# Patient Record
Sex: FEMALE | Race: WHITE | NOT HISPANIC OR LATINO
[De-identification: names, ages, dates, MRNs, and addresses within clinical notes are randomized per-mention and may not be internally consistent; named-entity substitution may affect disease eponyms.]

---

## 2017-07-30 ENCOUNTER — TRANSCRIPTION ENCOUNTER (OUTPATIENT)
Age: 27
End: 2017-07-30

## 2018-10-16 ENCOUNTER — TRANSCRIPTION ENCOUNTER (OUTPATIENT)
Age: 28
End: 2018-10-16

## 2018-11-29 ENCOUNTER — TRANSCRIPTION ENCOUNTER (OUTPATIENT)
Age: 28
End: 2018-11-29

## 2018-12-07 ENCOUNTER — TRANSCRIPTION ENCOUNTER (OUTPATIENT)
Age: 28
End: 2018-12-07

## 2019-02-25 ENCOUNTER — TRANSCRIPTION ENCOUNTER (OUTPATIENT)
Age: 29
End: 2019-02-25

## 2019-07-27 ENCOUNTER — TRANSCRIPTION ENCOUNTER (OUTPATIENT)
Age: 29
End: 2019-07-27

## 2020-12-28 ENCOUNTER — ASOB RESULT (OUTPATIENT)
Age: 30
End: 2020-12-28

## 2020-12-28 ENCOUNTER — APPOINTMENT (OUTPATIENT)
Dept: ANTEPARTUM | Facility: CLINIC | Age: 30
End: 2020-12-28
Payer: MEDICAID

## 2020-12-28 PROBLEM — Z00.00 ENCOUNTER FOR PREVENTIVE HEALTH EXAMINATION: Status: ACTIVE | Noted: 2020-12-28

## 2020-12-28 PROCEDURE — 76813 OB US NUCHAL MEAS 1 GEST: CPT | Mod: 59

## 2021-01-06 ENCOUNTER — TRANSCRIPTION ENCOUNTER (OUTPATIENT)
Age: 31
End: 2021-01-06

## 2021-02-22 ENCOUNTER — ASOB RESULT (OUTPATIENT)
Age: 31
End: 2021-02-22

## 2021-02-22 ENCOUNTER — APPOINTMENT (OUTPATIENT)
Dept: ANTEPARTUM | Facility: CLINIC | Age: 31
End: 2021-02-22
Payer: MEDICAID

## 2021-02-22 PROCEDURE — 76805 OB US >/= 14 WKS SNGL FETUS: CPT

## 2021-02-22 PROCEDURE — 99072 ADDL SUPL MATRL&STAF TM PHE: CPT

## 2021-03-26 ENCOUNTER — APPOINTMENT (OUTPATIENT)
Dept: ANTEPARTUM | Facility: CLINIC | Age: 31
End: 2021-03-26
Payer: MEDICAID

## 2021-03-26 ENCOUNTER — ASOB RESULT (OUTPATIENT)
Age: 31
End: 2021-03-26

## 2021-03-26 PROCEDURE — 99072 ADDL SUPL MATRL&STAF TM PHE: CPT

## 2021-03-26 PROCEDURE — 76817 TRANSVAGINAL US OBSTETRIC: CPT

## 2021-03-26 PROCEDURE — 76816 OB US FOLLOW-UP PER FETUS: CPT

## 2021-06-08 ENCOUNTER — APPOINTMENT (OUTPATIENT)
Dept: ANTEPARTUM | Facility: CLINIC | Age: 31
End: 2021-06-08
Payer: MEDICAID

## 2021-06-08 ENCOUNTER — ASOB RESULT (OUTPATIENT)
Age: 31
End: 2021-06-08

## 2021-06-08 PROCEDURE — 76816 OB US FOLLOW-UP PER FETUS: CPT

## 2021-07-08 ENCOUNTER — APPOINTMENT (OUTPATIENT)
Dept: DISASTER EMERGENCY | Facility: OTHER | Age: 31
End: 2021-07-08

## 2024-01-02 ENCOUNTER — HOSPITAL ENCOUNTER (EMERGENCY)
Facility: HOSPITAL | Age: 34
Discharge: LEFT BEFORE DISCHARGE | End: 2024-01-02
Payer: COMMERCIAL

## 2024-01-02 VITALS
OXYGEN SATURATION: 100 % | TEMPERATURE: 98.4 F | SYSTOLIC BLOOD PRESSURE: 132 MMHG | DIASTOLIC BLOOD PRESSURE: 98 MMHG | RESPIRATION RATE: 18 BRPM | HEART RATE: 105 BPM

## 2024-01-02 LAB
ALBUMIN SERPL BCP-MCNC: 4.4 G/DL (ref 3.5–5)
ALP SERPL-CCNC: 65 U/L (ref 34–104)
ALT SERPL W P-5'-P-CCNC: 20 U/L (ref 7–52)
ANION GAP SERPL CALCULATED.3IONS-SCNC: 6 MMOL/L
AST SERPL W P-5'-P-CCNC: 14 U/L (ref 13–39)
BASOPHILS # BLD AUTO: 0.04 THOUSANDS/ÂΜL (ref 0–0.1)
BASOPHILS NFR BLD AUTO: 0 % (ref 0–1)
BILIRUB SERPL-MCNC: 0.54 MG/DL (ref 0.2–1)
BUN SERPL-MCNC: 10 MG/DL (ref 5–25)
CALCIUM SERPL-MCNC: 9.3 MG/DL (ref 8.4–10.2)
CARDIAC TROPONIN I PNL SERPL HS: <2 NG/L
CHLORIDE SERPL-SCNC: 105 MMOL/L (ref 96–108)
CO2 SERPL-SCNC: 26 MMOL/L (ref 21–32)
CREAT SERPL-MCNC: 0.69 MG/DL (ref 0.6–1.3)
EOSINOPHIL # BLD AUTO: 0.16 THOUSAND/ÂΜL (ref 0–0.61)
EOSINOPHIL NFR BLD AUTO: 2 % (ref 0–6)
ERYTHROCYTE [DISTWIDTH] IN BLOOD BY AUTOMATED COUNT: 12.3 % (ref 11.6–15.1)
GFR SERPL CREATININE-BSD FRML MDRD: 116 ML/MIN/1.73SQ M
GLUCOSE SERPL-MCNC: 84 MG/DL (ref 65–140)
HCT VFR BLD AUTO: 47.4 % (ref 34.8–46.1)
HGB BLD-MCNC: 15.5 G/DL (ref 11.5–15.4)
IMM GRANULOCYTES # BLD AUTO: 0.02 THOUSAND/UL (ref 0–0.2)
IMM GRANULOCYTES NFR BLD AUTO: 0 % (ref 0–2)
LYMPHOCYTES # BLD AUTO: 1.89 THOUSANDS/ÂΜL (ref 0.6–4.47)
LYMPHOCYTES NFR BLD AUTO: 19 % (ref 14–44)
MAGNESIUM SERPL-MCNC: 1.8 MG/DL (ref 1.9–2.7)
MCH RBC QN AUTO: 28.6 PG (ref 26.8–34.3)
MCHC RBC AUTO-ENTMCNC: 32.7 G/DL (ref 31.4–37.4)
MCV RBC AUTO: 88 FL (ref 82–98)
MONOCYTES # BLD AUTO: 0.59 THOUSAND/ÂΜL (ref 0.17–1.22)
MONOCYTES NFR BLD AUTO: 6 % (ref 4–12)
NEUTROPHILS # BLD AUTO: 7.21 THOUSANDS/ÂΜL (ref 1.85–7.62)
NEUTS SEG NFR BLD AUTO: 73 % (ref 43–75)
NRBC BLD AUTO-RTO: 0 /100 WBCS
PLATELET # BLD AUTO: 262 THOUSANDS/UL (ref 149–390)
PMV BLD AUTO: 8.9 FL (ref 8.9–12.7)
POTASSIUM SERPL-SCNC: 3.6 MMOL/L (ref 3.5–5.3)
PROT SERPL-MCNC: 7 G/DL (ref 6.4–8.4)
RBC # BLD AUTO: 5.42 MILLION/UL (ref 3.81–5.12)
SODIUM SERPL-SCNC: 137 MMOL/L (ref 135–147)
WBC # BLD AUTO: 9.91 THOUSAND/UL (ref 4.31–10.16)

## 2024-01-02 PROCEDURE — 36415 COLL VENOUS BLD VENIPUNCTURE: CPT

## 2024-01-02 PROCEDURE — 85025 COMPLETE CBC W/AUTO DIFF WBC: CPT

## 2024-01-02 PROCEDURE — 84484 ASSAY OF TROPONIN QUANT: CPT

## 2024-01-02 PROCEDURE — 80053 COMPREHEN METABOLIC PANEL: CPT

## 2024-01-02 PROCEDURE — 83735 ASSAY OF MAGNESIUM: CPT | Performed by: EMERGENCY MEDICINE

## 2024-01-03 ENCOUNTER — OFFICE VISIT (OUTPATIENT)
Dept: URGENT CARE | Facility: CLINIC | Age: 34
End: 2024-01-03
Payer: COMMERCIAL

## 2024-01-03 VITALS
TEMPERATURE: 97.3 F | OXYGEN SATURATION: 99 % | RESPIRATION RATE: 20 BRPM | SYSTOLIC BLOOD PRESSURE: 122 MMHG | DIASTOLIC BLOOD PRESSURE: 74 MMHG | HEART RATE: 99 BPM

## 2024-01-03 DIAGNOSIS — Z75.8 DOES NOT HAVE PRIMARY CARE PROVIDER: ICD-10-CM

## 2024-01-03 DIAGNOSIS — R20.0 NUMBNESS AND TINGLING: Primary | ICD-10-CM

## 2024-01-03 DIAGNOSIS — R20.2 NUMBNESS AND TINGLING: Primary | ICD-10-CM

## 2024-01-03 PROCEDURE — 99213 OFFICE O/P EST LOW 20 MIN: CPT

## 2024-01-03 RX ORDER — BIOTIN 1000 MCG
TABLET,CHEWABLE ORAL 3 TIMES DAILY
COMMUNITY

## 2024-01-03 RX ORDER — MULTIVIT WITH MINERALS/LUTEIN
250 TABLET ORAL DAILY
COMMUNITY

## 2024-01-03 RX ORDER — NABUMETONE 500 MG/1
500 TABLET, FILM COATED ORAL 2 TIMES DAILY
COMMUNITY

## 2024-01-03 NOTE — PROGRESS NOTES
[facility]        NAME: Beatriz Otto is a 33 y.o. female  : 1990    MRN: [id number]  DATE: January 3, 2024  TIME: 10:57 AM    Assessment and Plan   Numbness and tingling [R20.0, R20.2]  1. Numbness and tingling        2. Does not have primary care provider  Ambulatory Referral to Family Practice        Symptoms over 1 year.    Patient Instructions   Establish care with a PCP - a referral has been created.  If symptoms gets worse - proceed to the Emergency Department for re-evaluation.    Chief Complaint     Chief Complaint   Patient presents with   • Numbness     Pt c/o numbeness and tingling in arms and legs mostly on right side and had previous numbness during pregnancy but told it would go away. Now hard to open jars or to walk with loss of strength worse this past week. Also has swollen ankle yerterday on right side         History of Present Illness       States tingling and numbness in extremities over 1 year ago. In the last week, feels like right arm is weaker when trying to hold daughter who is about 26 lbs. States she had carpal tunnel 3 years ago in her right wrist and was told it would be better after pregnancy but it never resolved.        Review of Systems   Review of Systems   Constitutional:  Negative for chills and fever.   Respiratory:  Negative for shortness of breath.    Gastrointestinal:  Negative for abdominal pain, nausea and vomiting.   Neurological:  Positive for weakness and numbness. Negative for dizziness.         Current Medications       Current Outpatient Medications:   •  ascorbic acid (VITAMIN C) 250 mg tablet, Take 250 mg by mouth daily, Disp: , Rfl:   •  Biotin 1000 MCG CHEW, Chew 3 (three) times a day, Disp: , Rfl:   •  nabumetone (RELAFEN) 500 mg tablet, Take 500 mg by mouth 2 (two) times a day, Disp: , Rfl:     Current Allergies     Allergies as of 2024   • (No Known Allergies)            The following portions of the patient's history were reviewed and  updated as appropriate: allergies, current medications, past family history, past medical history, past social history, past surgical history and problem list.     Past Medical History:   Diagnosis Date   • Hypertension    • Irritable bowel    • Psoriatic arthritis (HCC)        History reviewed. No pertinent surgical history.    History reviewed. No pertinent family history.      Medications have been verified.        Objective   /74   Pulse 99   Temp (!) 97.3 °F (36.3 °C) (Tympanic)   Resp 20   SpO2 99%   No LMP recorded.       Physical Exam     Physical Exam  Vitals and nursing note reviewed.   Constitutional:       Appearance: Normal appearance.   HENT:      Head: Normocephalic and atraumatic.   Pulmonary:      Effort: Pulmonary effort is normal.   Skin:     General: Skin is warm and dry.      Capillary Refill: Capillary refill takes less than 2 seconds.   Neurological:      General: No focal deficit present.      Mental Status: She is alert and oriented to person, place, and time. Mental status is at baseline.      GCS: GCS eye subscore is 4. GCS verbal subscore is 5. GCS motor subscore is 6.      Sensory: Sensory deficit (Reports tingling in both hands) present.      Motor: No weakness.      Coordination: Coordination is intact.      Gait: Gait is intact.   Psychiatric:         Mood and Affect: Mood normal.         Behavior: Behavior normal.         Thought Content: Thought content normal.

## 2024-01-03 NOTE — PATIENT INSTRUCTIONS
Establish care with a PCP - a referral has been created.  If symptoms gets worse - proceed to the Emergency Department for re-evaluation.

## 2024-01-05 ENCOUNTER — PATIENT MESSAGE (OUTPATIENT)
Age: 34
End: 2024-01-05

## 2024-01-05 ENCOUNTER — APPOINTMENT (OUTPATIENT)
Age: 34
End: 2024-01-05
Payer: COMMERCIAL

## 2024-01-05 ENCOUNTER — OFFICE VISIT (OUTPATIENT)
Age: 34
End: 2024-01-05
Payer: COMMERCIAL

## 2024-01-05 VITALS
WEIGHT: 138 LBS | SYSTOLIC BLOOD PRESSURE: 118 MMHG | HEART RATE: 110 BPM | TEMPERATURE: 96.5 F | RESPIRATION RATE: 18 BRPM | BODY MASS INDEX: 25.4 KG/M2 | DIASTOLIC BLOOD PRESSURE: 78 MMHG | OXYGEN SATURATION: 92 % | HEIGHT: 62 IN

## 2024-01-05 DIAGNOSIS — F41.9 ANXIETY AND DEPRESSION: ICD-10-CM

## 2024-01-05 DIAGNOSIS — Z00.00 HEALTH CARE MAINTENANCE: ICD-10-CM

## 2024-01-05 DIAGNOSIS — F32.A ANXIETY AND DEPRESSION: ICD-10-CM

## 2024-01-05 DIAGNOSIS — Z12.4 CERVICAL CANCER SCREENING: ICD-10-CM

## 2024-01-05 DIAGNOSIS — Z75.8 DOES NOT HAVE PRIMARY CARE PROVIDER: ICD-10-CM

## 2024-01-05 DIAGNOSIS — L40.50 PSORIATIC ARTHRITIS (HCC): ICD-10-CM

## 2024-01-05 DIAGNOSIS — R20.0 NUMBNESS AND TINGLING: ICD-10-CM

## 2024-01-05 DIAGNOSIS — R20.2 NUMBNESS AND TINGLING: ICD-10-CM

## 2024-01-05 DIAGNOSIS — R20.0 NUMBNESS AND TINGLING: Primary | ICD-10-CM

## 2024-01-05 DIAGNOSIS — R20.2 NUMBNESS AND TINGLING: Primary | ICD-10-CM

## 2024-01-05 LAB
CHOLEST SERPL-MCNC: 193 MG/DL
HDLC SERPL-MCNC: 46 MG/DL
IRON SATN MFR SERPL: 11 % (ref 15–50)
IRON SERPL-MCNC: 46 UG/DL (ref 50–212)
LDLC SERPL CALC-MCNC: 91 MG/DL (ref 0–100)
NONHDLC SERPL-MCNC: 147 MG/DL
TIBC SERPL-MCNC: 416 UG/DL (ref 250–450)
TRIGL SERPL-MCNC: 282 MG/DL
UIBC SERPL-MCNC: 370 UG/DL (ref 155–355)

## 2024-01-05 PROCEDURE — 36415 COLL VENOUS BLD VENIPUNCTURE: CPT

## 2024-01-05 PROCEDURE — 83540 ASSAY OF IRON: CPT

## 2024-01-05 PROCEDURE — 80061 LIPID PANEL: CPT

## 2024-01-05 PROCEDURE — 82607 VITAMIN B-12: CPT

## 2024-01-05 PROCEDURE — 99204 OFFICE O/P NEW MOD 45 MIN: CPT

## 2024-01-05 PROCEDURE — 83550 IRON BINDING TEST: CPT

## 2024-01-05 PROCEDURE — 82746 ASSAY OF FOLIC ACID SERUM: CPT

## 2024-01-05 PROCEDURE — 82728 ASSAY OF FERRITIN: CPT

## 2024-01-05 PROCEDURE — 82652 VIT D 1 25-DIHYDROXY: CPT

## 2024-01-05 PROCEDURE — 84443 ASSAY THYROID STIM HORMONE: CPT

## 2024-01-05 RX ORDER — BUPROPION HYDROCHLORIDE 150 MG/1
300 TABLET ORAL EVERY MORNING
Qty: 180 TABLET | Refills: 3 | Status: SHIPPED | OUTPATIENT
Start: 2024-01-05 | End: 2024-12-30

## 2024-01-05 NOTE — PROGRESS NOTES
Name: Beatriz Otto      : 1990      MRN: [id number]  Encounter Provider: Maria E Carpenter PA-C  Encounter Date: 2024   Encounter department: [facility] [place]    Assessment & Plan     1. Numbness and tingling  Assessment & Plan:  Pt has subjective complaints of numbness and tingling that has been progressing over the past few years, in particular over the last 4-5 months.  No red flags present that would necessitate an urgent referral or imaging.  No history of trauma to spine that would make me consider this radicular in nature.  Pt also stopped her Humira around the time the symptoms were becoming worse, however, the N/T was present prior to losing access to Humira. Pt recently got insurance again and presents for evaluation.    Will start with labs to evaluate for anemia, B12/folate deficiency, thyroid d/o, electrolyte disorder.  Pt had extensive workup with rheumatology in the past and does have psoriatic arthritis so will not repeat these.  Referral to neurology for her migraines and if her labs are unrevealing, may need EMG testing and follow up with neurology anyway.  Will call with results.    Orders:  -     Ambulatory Referral to Neurology; Future  -     Vitamin B12/Folate, Serum Panel; Future  -     Iron Panel (Includes Ferritin, Iron Sat%, Iron, and TIBC); Future  -     TSH, 3rd generation with Free T4 reflex; Future  -     Vitamin D 1,25 dihydroxy; Future    2. Anxiety and depression  Assessment & Plan:  Pt has been on and off several SSRIs throughout the years as a teenager.  She had SI while on Lexapro as a teen.  She did not feel zoloft was a good fit for her.  Not open to prozac.  Did OK on wellbutrin, but doesn't think the dose was high enough.  Pt wishes to go back on the Wellbutrin.  Will start at 150 mg in the morning x 2 weeks, then 150 BID, then 300 a.m. 150 p.m.  Pt will follow up at that time to see how she is doing on these dosages.  She is also a smoker  so this would be a good fit to help her quit at this time.      MARGARITO-7 Flowsheet Screening      Flowsheet Row Most Recent Value   Over the last 2 weeks, how often have you been bothered by any of the following problems?    Feeling nervous, anxious, or on edge 2   Not being able to stop or control worrying 2   Worrying too much about different things 2   Trouble relaxing 2   Being so restless that it is hard to sit still 1   Becoming easily annoyed or irritable 3   Feeling afraid as if something awful might happen 1   MARGARITO-7 Total Score 13          PHQ-2/9 Depression Screening    Little interest or pleasure in doing things: 1 - several days  Feeling down, depressed, or hopeless: 1 - several days  Trouble falling or staying asleep, or sleeping too much: 3 - nearly every day  Feeling tired or having little energy: 3 - nearly every day  Poor appetite or overeatin - more than half the days  Feeling bad about yourself - or that you are a failure or have let yourself or your family down: 3 - nearly every day  Trouble concentrating on things, such as reading the newspaper or watching television: 3 - nearly every day  Moving or speaking so slowly that other people could have noticed. Or the opposite - being so fidgety or restless that you have been moving around a lot more than usual: 0 - not at all  Thoughts that you would be better off dead, or of hurting yourself in some way: 0 - not at all  PHQ-2 Score: 2  PHQ-2 Interpretation: Negative depression screen  PHQ-9 Score: 16  PHQ-9 Interpretation: Moderately severe depression         Orders:  -     buPROPion (WELLBUTRIN XL) 150 mg 24 hr tablet; Take 2 tablets (300 mg total) by mouth every morning Take one tablet once a day for two weeks, then one tablet twice a day for two weeks.  Then increase to two tablets in the morning, one tablet at night for two weeks.    3. Psoriatic arthritis (HCC)  Assessment & Plan:  Rheumatology referral to establish as she is new to this  area.    Orders:  -     Ambulatory Referral to Rheumatology; Future    4. Cervical cancer screening  -     Ambulatory Referral to Gynecology; Future    5. Health care maintenance  -     Lipid panel; Future    6. Does not have primary care provider  Comments:  Esablishing today with me.  Orders:  -     Ambulatory Referral to Family Practice           Subjective      HPI    Pt is here to establish care and for a problem visit.    Pt sees rheumatology for psoriatic arthritis.  Would like referral.    Saw neurology in the past for migraines.  Had them since age 11, comes and goes.  More recently they are more frequent and debilitating.  Denies aura, but does have photo and sound sensitivity.  Last for a least a day or two.  Has only used Advil and ibuprofen in the past.      Pt also had preeclampsia during pregnancy.  BP has been good recently.      Reports during pregnancy told she has pregnancy-induced CTS.  Both hands.  Did not improve post pregnancy.  Did wear wrist braces, but it only helped minimally.  In the past few months it has been progressively getting worse.  It now wakes her up at night with sharp pains accompanied with pins and needles sensation.  She states she started feeling it in her legs with numbness and tingling over the past few months as well.    Also reports extreme fatigue, feels cold all the time.    No past surgical history.  No known vitamin deficiencies.  Was told she has a tricuspid and mitral valve leaks, trace, that is being monitored.  Depression and anxiety.  Denies manic episodes.  Slightly elevated choleserol.  IBS, which the humira helped as well.      She has been getting more frequent periods every 3 weeks and they are heavier since her last pregnancy.  Needs to establish with GYN.      Review of Systems   Constitutional:  Positive for fatigue. Negative for chills, diaphoresis, fever and unexpected weight change.   HENT: Negative.     Eyes:  Negative for visual disturbance.  "  Respiratory:  Negative for shortness of breath.    Cardiovascular:  Negative for chest pain and palpitations.   Gastrointestinal: Negative.    Endocrine: Positive for cold intolerance.   Musculoskeletal:  Positive for arthralgias (generalized upper and lower extremities, intermittent) and myalgias (generalized upper and lower extremities, intermittent). Negative for gait problem.   Skin:  Negative for rash.   Neurological:  Positive for numbness (N/T hands, wrists). Negative for syncope, facial asymmetry, light-headedness and headaches.   All other systems reviewed and are negative.      Current Outpatient Medications on File Prior to Visit   Medication Sig   • ascorbic acid (VITAMIN C) 250 mg tablet Take 250 mg by mouth daily   • Biotin 1000 MCG CHEW Chew 3 (three) times a day   • nabumetone (RELAFEN) 500 mg tablet Take 500 mg by mouth 2 (two) times a day       Objective     /78 (BP Location: Right arm, Patient Position: Sitting, Cuff Size: Standard)   Pulse (!) 110   Temp (!) 96.5 °F (35.8 °C) (Tympanic)   Resp 18   Ht 5' 2\" (1.575 m)   Wt 62.6 kg (138 lb)   SpO2 92%   BMI 25.24 kg/m²     Physical Exam  Vitals reviewed.   Constitutional:       General: She is not in acute distress.     Appearance: Normal appearance. She is not toxic-appearing.   HENT:      Head: Normocephalic and atraumatic.      Right Ear: Hearing, tympanic membrane, ear canal and external ear normal.      Left Ear: Hearing, tympanic membrane, ear canal and external ear normal.      Nose: Nose normal.      Mouth/Throat:      Lips: Pink.      Mouth: Mucous membranes are moist.      Pharynx: Oropharynx is clear. Uvula midline.   Eyes:      General: Lids are normal. No scleral icterus.     Extraocular Movements: Extraocular movements intact.      Conjunctiva/sclera: Conjunctivae normal.      Pupils: Pupils are equal, round, and reactive to light.   Neck:      Thyroid: No thyroid mass, thyromegaly or thyroid tenderness. "   Cardiovascular:      Rate and Rhythm: Normal rate and regular rhythm.      Heart sounds: Normal heart sounds. No murmur heard.  Pulmonary:      Effort: Pulmonary effort is normal. No respiratory distress.      Breath sounds: Normal breath sounds.   Abdominal:      General: Bowel sounds are normal.      Palpations: Abdomen is soft.      Tenderness: There is no abdominal tenderness.   Musculoskeletal:      Cervical back: Full passive range of motion without pain.      Right lower leg: No edema.      Left lower leg: No edema.      Comments: Pt has full active and passive ROM of the upper and lower extremities, steady gait, no point tenderness, no active numbness or tingling during exam.  Pt able to ambulate without difficulty.  No neurologic or MSK deficits noted on exam.   Lymphadenopathy:      Cervical: No cervical adenopathy.   Skin:     General: Skin is warm and dry.      Capillary Refill: Capillary refill takes less than 2 seconds.      Coloration: Skin is not cyanotic.      Findings: No rash.   Neurological:      General: No focal deficit present.      Mental Status: She is alert and oriented to person, place, and time.      GCS: GCS eye subscore is 4. GCS verbal subscore is 5. GCS motor subscore is 6.      Cranial Nerves: Cranial nerves 2-12 are intact.      Sensory: Sensation is intact.      Motor: Motor function is intact.      Coordination: Coordination is intact.      Gait: Gait is intact.   Psychiatric:         Mood and Affect: Mood normal.         Behavior: Behavior normal.       Maria E Carpenter PA-C

## 2024-01-05 NOTE — ASSESSMENT & PLAN NOTE
Pt has subjective complaints of numbness and tingling that has been progressing over the past few years, in particular over the last 4-5 months.  No red flags present that would necessitate an urgent referral or imaging.  No history of trauma to spine that would make me consider this radicular in nature.  Pt also stopped her Humira around the time the symptoms were becoming worse, however, the N/T was present prior to losing access to Humira. Pt recently got insurance again and presents for evaluation.    Will start with labs to evaluate for anemia, B12/folate deficiency, thyroid d/o, electrolyte disorder.  Pt had extensive workup with rheumatology in the past and does have psoriatic arthritis so will not repeat these.  Referral to neurology for her migraines and if her labs are unrevealing, may need EMG testing and follow up with neurology anyway.  Will call with results.

## 2024-01-05 NOTE — ASSESSMENT & PLAN NOTE
Pt has been on and off several SSRIs throughout the years as a teenager.  She had SI while on Lexapro as a teen.  She did not feel zoloft was a good fit for her.  Not open to prozac.  Did OK on wellbutrin, but doesn't think the dose was high enough.  Pt wishes to go back on the Wellbutrin.  Will start at 150 mg in the morning x 2 weeks, then 150 BID, then 300 a.m. 150 p.m.  Pt will follow up at that time to see how she is doing on these dosages.  She is also a smoker so this would be a good fit to help her quit at this time.      MARGARITO-7 Flowsheet Screening      Flowsheet Row Most Recent Value   Over the last 2 weeks, how often have you been bothered by any of the following problems?    Feeling nervous, anxious, or on edge 2   Not being able to stop or control worrying 2   Worrying too much about different things 2   Trouble relaxing 2   Being so restless that it is hard to sit still 1   Becoming easily annoyed or irritable 3   Feeling afraid as if something awful might happen 1   MARGARITO-7 Total Score 13          PHQ-2/9 Depression Screening    Little interest or pleasure in doing things: 1 - several days  Feeling down, depressed, or hopeless: 1 - several days  Trouble falling or staying asleep, or sleeping too much: 3 - nearly every day  Feeling tired or having little energy: 3 - nearly every day  Poor appetite or overeatin - more than half the days  Feeling bad about yourself - or that you are a failure or have let yourself or your family down: 3 - nearly every day  Trouble concentrating on things, such as reading the newspaper or watching television: 3 - nearly every day  Moving or speaking so slowly that other people could have noticed. Or the opposite - being so fidgety or restless that you have been moving around a lot more than usual: 0 - not at all  Thoughts that you would be better off dead, or of hurting yourself in some way: 0 - not at all  PHQ-2 Score: 2  PHQ-2 Interpretation: Negative depression  screen  PHQ-9 Score: 16  PHQ-9 Interpretation: Moderately severe depression

## 2024-01-06 LAB
FERRITIN SERPL-MCNC: 12 NG/ML (ref 11–307)
FOLATE SERPL-MCNC: 16.3 NG/ML
TSH SERPL DL<=0.05 MIU/L-ACNC: 1.84 UIU/ML (ref 0.45–4.5)
VIT B12 SERPL-MCNC: 323 PG/ML (ref 180–914)

## 2024-01-08 LAB — 1,25(OH)2D3 SERPL-MCNC: 32 PG/ML (ref 24.8–81.5)

## 2024-01-09 DIAGNOSIS — R20.0 NUMBNESS AND TINGLING: Primary | ICD-10-CM

## 2024-01-09 DIAGNOSIS — R20.2 NUMBNESS AND TINGLING: Primary | ICD-10-CM

## 2024-01-24 ENCOUNTER — TELEPHONE (OUTPATIENT)
Age: 34
End: 2024-01-24

## 2024-02-01 DIAGNOSIS — F33.1 MODERATE EPISODE OF RECURRENT MAJOR DEPRESSIVE DISORDER (HCC): Primary | ICD-10-CM

## 2024-02-01 DIAGNOSIS — L71.9 ROSACEA: Primary | ICD-10-CM

## 2024-02-01 PROBLEM — F41.1 GENERALIZED ANXIETY DISORDER: Status: ACTIVE | Noted: 2024-02-01

## 2024-02-01 RX ORDER — BUPROPION HYDROCHLORIDE 150 MG/1
150 TABLET ORAL EVERY MORNING
Qty: 90 TABLET | Refills: 0 | Status: SHIPPED | OUTPATIENT
Start: 2024-02-01 | End: 2024-05-01

## 2024-02-20 ENCOUNTER — OFFICE VISIT (OUTPATIENT)
Age: 34
End: 2024-02-20
Payer: COMMERCIAL

## 2024-02-20 VITALS — BODY MASS INDEX: 25.97 KG/M2 | WEIGHT: 142 LBS

## 2024-02-20 DIAGNOSIS — Z30.431 IUD CHECK UP: ICD-10-CM

## 2024-02-20 DIAGNOSIS — N92.0 MENORRHAGIA WITH REGULAR CYCLE: Primary | ICD-10-CM

## 2024-02-20 PROCEDURE — 99203 OFFICE O/P NEW LOW 30 MIN: CPT | Performed by: NURSE PRACTITIONER

## 2024-02-20 RX ORDER — LEVONORGESTREL 52 MG/1
1 INTRAUTERINE DEVICE INTRAUTERINE
COMMUNITY

## 2024-02-20 NOTE — PROGRESS NOTES
Diagnoses and all orders for this visit:    Menorrhagia with regular cycle  -     US pelvis complete w transvaginal; Future    IUD check up  -     Ambulatory Referral to Gynecology    Call if no symptom improvement, all questions answered, return for annual.        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 She states she has been having heavy periods and clotting for about 7-8 months. She is also having bad cramps as well. She states the day before and day of period she gets a high fever. Her last Pap was about year and half ago in [place]. She does not have hx of abnormal paps. Her Mirena placed 2 years ago in June 2022. Her cycles are lasting 5-7 days. She does have a history of ovarian cysts. She does not have any pelvic pain or pain with sex but sometimes feels a twinge of pain which lasts a few seconds during sex. She had a previous Mirena before her last baby and she was amenorrheic on it. Her cycles are different with this Mirena.    Past Medical History:   Diagnosis Date    Asthma 1998    [place] asthma    Coronary artery disease 2015    2 trace leaks in valves    Depression 2004    Heart valve disease 2015    Hypertension     Irritable bowel     Kidney stone 2008,2011,2022    Migraine 2001    Psoriatic arthritis (HCC)      History reviewed. No pertinent surgical history.  Social History     Tobacco Use    Smoking status: Every Day     Current packs/day: 0.50     Average packs/day: 0.5 packs/day for 17.1 years (8.6 ttl pk-yrs)     Types: Cigarettes     Start date: 1/1/2007    Smokeless tobacco: Never   Substance Use Topics    Alcohol use: Never    Drug use: Never     Family History   Problem Relation Age of Onset    Asthma Mother     Heart disease Mother     Hypertension Mother     Lung disease Mother     Migraines Mother     Rashes / Skin problems Mother     Cancer Father     Diabetes Father     Hypertension Father     Thyroid disease Father     Asthma Maternal Grandmother     Heart disease  Maternal Grandmother     Lung disease Maternal Grandmother     Osteoarthritis Maternal Grandmother     Stroke Maternal Grandmother     Asthma Paternal Grandmother     Lung disease Paternal Grandmother        Current Outpatient Medications:     ascorbic acid (VITAMIN C) 250 mg tablet, Take 250 mg by mouth daily, Disp: , Rfl:     Biotin 1000 MCG CHEW, Chew 3 (three) times a day, Disp: , Rfl:     Levonorgestrel (Mirena, 52 MG,) 20 MCG/DAY IUD, 1 each by Intrauterine route Once every 8 years, Disp: , Rfl:     nabumetone (RELAFEN) 500 mg tablet, Take 500 mg by mouth 2 (two) times a day, Disp: , Rfl:     buPROPion (WELLBUTRIN XL) 150 mg 24 hr tablet, Take 1 tablet (150 mg total) by mouth every morning (Patient not taking: Reported on 2024), Disp: 90 tablet, Rfl: 0    No Known Allergies  OB History    Para Term  AB Living   4 3     1 3   SAB IAB Ectopic Multiple Live Births                  # Outcome Date GA Lbr Jeremias/2nd Weight Sex Delivery Anes PTL Lv   4 AB            3 Para            2 Para            1 Para               Obstetric Comments   Vaginal delivery x 3     Girl, boy,girl  3, 9 and 12    Vitals:    24 1127   Weight: 64.4 kg (142 lb)     Body mass index is 25.97 kg/m².  Patient's last menstrual period was 2024.    Review of Systems   Constitutional: Negative for chills, fatigue, fever and unexpected weight change.   Respiratory: Negative for shortness of breath.    Gastrointestinal: Negative for anal bleeding, blood in stool, constipation and diarrhea.   Genitourinary: Negative for difficulty urinating, dysuria and hematuria.     Physical Exam   Constitutional: She appears well-developed and well-nourished. No distress. Alert and oriented.  HENT: atraumatic, EOMI bilaterally  Head: Normocephalic.   Neck: Normal range of motion. Neck supple.   Pulmonary: Effort normal.  Abdominal: Soft.   Pelvic exam was performed with patient supine. No labial fusion. There is no rash, tenderness,  lesion or injury on the right labia. There is no rash, tenderness, lesion or injury on the left labia. Urethral meatus does not show any tenderness, inflammation or discharge. Palpation of midline bladder without pain or discomfort. Uterus is not deviated, not enlarged, not fixed and not tender. Cervix exhibits no motion tenderness, no discharge and no friability. Right adnexum displays no mass, no tenderness and no fullness. Left adnexum displays no mass, no tenderness and no fullness. No erythema or tenderness in the vagina. No foreign body in the vagina. No signs of injury around the vagina. Vaginal discharge found. Perineum and anus without areas of injury. No lesions noted or swelling. IUD strings NOT seen from OS but are palpable.  Lymphadenopathy:        Right: No inguinal adenopathy present.        Left: No inguinal adenopathy present.

## 2024-02-20 NOTE — PATIENT INSTRUCTIONS
Menorrhagia   AMBULATORY CARE:   Menorrhagia  is heavy menstrual bleeding for more than 7 days or severe menstrual bleeding for less than 7 days. Your menstrual bleeding and cramping are so heavy that you have trouble doing your usual daily activities. Your monthly period may also occur more often, and you may bleed between periods. Menorrhagia is common in adolescence and around menopause.       Common symptoms include the following:   Soaking a pad or tampon every 1 to 2 hours    Using both a pad and a tampon    Waking up at night to change your pad or tampon    Blood clots with your bleeding for more than 1 day    Abdominal pain or cramps    Call your local emergency number (911 in the [place]) for any of the following:   You have chest pain and shortness of breath.    Your heart is fluttering or beating faster than usual for you.    Seek care immediately if:   You feel dizzy when you stand.    You feel confused.    You have severe abdominal pain, nausea, and vomiting.    Your skin or the whites of your eyes turn yellow.    Call your doctor or gynecologist if:   You need to change your pad or tampon more than 1 time per hour, for several hours in a row.    You feel more weak and tired than usual.    You have new coldness in your hands and feet.    You have questions or concerns about your condition or care.    Medicines:  You may need any of the following:  Iron supplements  may be given if your blood iron level decreases because of heavy bleeding.    NSAIDs , such as ibuprofen, help decrease swelling, pain, and fever. NSAIDs can cause stomach bleeding or kidney problems in certain people. If you take blood thinner medicine, always ask your healthcare provider if NSAIDs are safe for you. Always read the medicine label and follow directions.    Hormones  help slow or stop your bleeding and make your monthly periods more regular. This medicine may be given as birth control pills or an intrauterine device (IUD).    Take  your medicine as directed.  Contact your healthcare provider if you think your medicine is not helping or if you have side effects. Tell your provider if you are allergic to any medicine. Keep a list of the medicines, vitamins, and herbs you take. Include the amounts, and when and why you take them. Bring the list or the pill bottles to follow-up visits. Carry your medicine list with you in case of an emergency.    Manage your symptoms:   Keep a supply of pads or tampons with you at all times.  If possible, stay close to a bathroom.    Apply heat  on your abdomen to decrease pain and cramps. You can use a heating pad on a low setting. Apply heat for 20 to 30 minutes every 2 hours for as many days as directed.    Follow up with your doctor or gynecologist as directed:  You may need regular pelvic exams with Pap smears to monitor your condition. Write down your questions so you remember to ask them during your visits.  © Copyright [employer] 2023 Information is for End User's use only and may not be sold, redistributed or otherwise used for commercial purposes.  The above information is an  only. It is not intended as medical advice for individual conditions or treatments. Talk to your doctor, nurse or pharmacist before following any medical regimen to see if it is safe and effective for you.

## 2024-02-21 RX ORDER — NABUMETONE 500 MG/1
500 TABLET, FILM COATED ORAL 2 TIMES DAILY
Refills: 0 | Status: CANCELLED | OUTPATIENT
Start: 2024-02-21

## 2024-02-22 DIAGNOSIS — L40.50 PSORIATIC ARTHRITIS (HCC): Primary | ICD-10-CM

## 2024-02-22 RX ORDER — NABUMETONE 500 MG/1
500 TABLET, FILM COATED ORAL 2 TIMES DAILY
Qty: 180 TABLET | Refills: 0 | Status: SHIPPED | OUTPATIENT
Start: 2024-02-22 | End: 2024-05-22

## 2024-02-28 ENCOUNTER — TELEPHONE (OUTPATIENT)
Dept: NEUROLOGY | Facility: CLINIC | Age: 34
End: 2024-02-28

## 2024-02-28 NOTE — TELEPHONE ENCOUNTER
Add on- 02/29 at 1 pm with Dr. Henry    Spoke with patient informing her that her appt needed to be reschedule. Patient understood and was rescheduled for 02/29.

## 2024-02-29 ENCOUNTER — CONSULT (OUTPATIENT)
Dept: NEUROLOGY | Facility: CLINIC | Age: 34
End: 2024-02-29
Payer: COMMERCIAL

## 2024-02-29 VITALS
BODY MASS INDEX: 25.61 KG/M2 | HEART RATE: 114 BPM | DIASTOLIC BLOOD PRESSURE: 80 MMHG | SYSTOLIC BLOOD PRESSURE: 130 MMHG | WEIGHT: 140 LBS

## 2024-02-29 DIAGNOSIS — R51.9 NEW ONSET HEADACHE: ICD-10-CM

## 2024-02-29 DIAGNOSIS — G62.9 NEUROPATHY: ICD-10-CM

## 2024-02-29 DIAGNOSIS — R20.0 NUMBNESS AND TINGLING: Primary | ICD-10-CM

## 2024-02-29 DIAGNOSIS — R20.2 NUMBNESS AND TINGLING: Primary | ICD-10-CM

## 2024-02-29 DIAGNOSIS — M54.10 RADICULOPATHY: ICD-10-CM

## 2024-02-29 DIAGNOSIS — G43.109 MIGRAINE WITH AURA AND WITHOUT STATUS MIGRAINOSUS, NOT INTRACTABLE: ICD-10-CM

## 2024-02-29 DIAGNOSIS — E53.8 B12 DEFICIENCY: ICD-10-CM

## 2024-02-29 PROCEDURE — 96372 THER/PROPH/DIAG INJ SC/IM: CPT | Performed by: PSYCHIATRY & NEUROLOGY

## 2024-02-29 PROCEDURE — 99204 OFFICE O/P NEW MOD 45 MIN: CPT | Performed by: PSYCHIATRY & NEUROLOGY

## 2024-02-29 RX ORDER — MAGNESIUM 200 MG
1000 TABLET ORAL DAILY
Qty: 30 TABLET | Refills: 3 | Status: SHIPPED | OUTPATIENT
Start: 2024-02-29

## 2024-02-29 RX ORDER — DULOXETIN HYDROCHLORIDE 30 MG/1
30 CAPSULE, DELAYED RELEASE ORAL DAILY
Qty: 30 CAPSULE | Refills: 1 | Status: SHIPPED | OUTPATIENT
Start: 2024-02-29

## 2024-02-29 RX ORDER — SUMATRIPTAN 50 MG/1
50 TABLET, FILM COATED ORAL ONCE AS NEEDED
Qty: 9 TABLET | Refills: 4 | Status: SHIPPED | OUTPATIENT
Start: 2024-02-29

## 2024-02-29 RX ORDER — CYANOCOBALAMIN 1000 UG/ML
1000 INJECTION, SOLUTION INTRAMUSCULAR; SUBCUTANEOUS
Status: SHIPPED | OUTPATIENT
Start: 2024-02-29

## 2024-02-29 RX ADMIN — CYANOCOBALAMIN 1000 MCG: 1000 INJECTION, SOLUTION INTRAMUSCULAR; SUBCUTANEOUS at 14:13

## 2024-02-29 NOTE — PROGRESS NOTES
Name: Beatriz Otto   Age & Sex: 33 y.o. female   MRN: [id number]     This patient was discussed and staffed with Dr. Robison    Assessment/Plan:    Pain and paresthesias  Beatriz Otto is a 33 y.o. female who presents for BUE/BLE pain and paresthesias that tends to be worst in the morning and attenuate throughout the day.  Although she clearly has additional CTS (see below), her limb pain sounds more consistent with a generalized rheumatological condition, particularly given that it improves without medication throughout the day and she has been off of her psoriatic arthritis medication due to moving.  That being said, there are aspects of this presentation that could be a length dependent small or large fiber type neuropathy and she is scheduled for an EMG.    Plan:  B12 low, supplement in office and sublingually  Check additional neuropathy labs including B6, zinc, Lyme, and inflammatory labs including ESR/CRP  Imaging as below  Agree with EMG which patient is scheduled for 3/18  F/u 3 mo    CTS  Patient has numbness and pain of bilateral hands which can be exacerbated after sleeping, versus after certain positions like staying in a single driving position for prolonged period.  Patient has mild weakness of thumb opposition and a positive Phalen but is negative Tinel at the wrist and elbow bilaterally and has no signs of thenar, hypothenar or other intrinsic muscle wasting.  She is scheduled to have EMG which will help clarify the degree of her CTS. We discussed the benefit of conservative measures such as overnight bracing to reduce positional exacerbation of her symptoms    Plan:  EMG  Wrist brace    Migraine   Patient's headaches are most consistent with her typical migraines but she does have increased frequency and some red flag features now such as waking up with headaches on a regular basis. At this point because of the evolving characteristics would continue workup including MRI as well as  treating/preventing the headaches with additional medication regimen     Plan:  Abortive: sumatriptan 50 mg   Preventive: trial duloxetine for migraine and possible nerve pain   Cycle breaker: declined for now  Imaging: MRI brain w/wo  Labs: neuropathy labs as above  Headache hygiene discussed including maintaining good sleep habits, drinking sufficient water, getting regular meals and exercise  Migraine/headache triggers outlined including foods, sleep deprivation, dehydration, CALOS  OTC and non-pharmacologic management discussed including Mg and B2, neck exercises and stretching, headache diary apps, etc.     Diagnoses and all orders for this visit:    Numbness and tingling  -     Ambulatory Referral to Neurology  -     MRI brain with and without contrast; Future  -     MRI cervical spine with and without contrast; Future  -     MRI angiogram head without contrast; Future  -     Comprehensive metabolic panel; Future  -     Zinc; Future  -     Vitamin B6; Future  -     Lyme Total AB W Reflex to IGM/IGG; Future  -     ESR-Jay+CRP; Future    New onset headache  -     MRI brain with and without contrast; Future  -     MRI angiogram head without contrast; Future  -     Comprehensive metabolic panel; Future    Radiculopathy  -     MRI cervical spine with and without contrast; Future    Neuropathy  -     Comprehensive metabolic panel; Future  -     Zinc; Future  -     Vitamin B6; Future  -     Lyme Total AB W Reflex to IGM/IGG; Future  -     ESR-Jay+CRP; Future    Migraine with aura and without status migrainosus, not intractable  -     DULoxetine (Cymbalta) 30 mg delayed release capsule; Take 1 capsule (30 mg total) by mouth daily  -     SUMAtriptan (Imitrex) 50 mg tablet; Take 1 tablet (50 mg total) by mouth once as needed for migraine Can take a second dose in 2 hours if not completely effective. Do not take more than 2 doses in a day. Do not take more than twice a week    B12 deficiency  -     cyanocobalamin injection  1,000 mcg  -     Cyanocobalamin (B-12) 1000 MCG SUBL; Place 1 tablet (1,000 mcg total) under the tongue in the morning          Subjective:         Beatriz Otto is a 33 y.o. female w/ PMH psoriatic arthritis and migraines who presents to establish care and to evaluate new onset pain and paresthesias she has been experiencing.  She notes that she awakens with severe pain from her shoulders to her fingertips, as well as sometimes from her hips to her legs and feet.  From a pain perspective she notes that this is predominantly a burning but can be a sharp pain.  She does note that it does not have a normal distribution but sometimes can be provoked by things like driving for a long time.  She does appear to have carpal tunnel with a positive Phalen and positional trigger as described above as well as mild bilateral weakness of thumb opposition.  However the initial pain appears to be more consistent with a rheumatological condition, particularly in the setting of her being unable to fill her psoriatic arthritis medications due to moving.  She is already scheduled to have an EMG to evaluate for possible neuropathy component, but would strongly recommend further evaluation with a rheumatologist with whom she is already scheduled.  He has significant tenderness to palpation right greater than left hip.  She also complains of a migraine which she has experienced since childhood.  This has not been involving this has not been changing in character or consistency, but does have some intermittent worsening of frequency, particularly since moving to the [facility].  She does state that there is a component that may be triggered with sinus pressure.  She is on biotin, bupropion, nabumetone as home meds.  She has had a history of her recent pregnancy with preeclampsia and has had trouble with her blood pressure since then.  She does have some mild tachycardia on exam today.  We did discuss possible preventative treatments  for her migraines including the possibility of TCAs/SNRIs, versus propranolol.  While propranolol would also treat her hypertension and tachycardia, SNRIs might be more appropriate for treating her headache as well as possible nerve pain.  She has had nephrolithiasis twice and therefore cannot have topiramate.    Headache characteristics:  Age of Onset/how long ongoing: since childhood  Location: front, top  Quality: sharp throbbing  Severity: moderate to severe when they occur  Frequency: at least once a week at this time  Duration: can be prolonged (up to 5 days)  Aura: yes black spots or stars  Associated (photo/phono/osmo): photo  Nausea/Vomiting:  nausea  Neurologic Features (numb arm/weak leg):  no  Red Flag Features (worsened with cough or valsalva/positional component/visual deficit): sometimes worsened with straining, headaches on awakening  Triggers: certain smells, one waking  Relieving factors: laying down in cool dark room  OTC Rx (OTC med use/treatment days per month):  limited   Current Prescriptions: wellbutrin  Past Prescriptions: none  OCPs/Pregnancy  no  FH SAH, aneurysm or migraines:  aneurysm in aunt ( @ 27-28), frequent strokes in the family  TBI or neurosurgical intervention: no  Sleep/Mood:  sleep is disrupted (3 kids including 1 y/o who does not sleep through the night)  Caffeine/Water:  significant caffeine, not great with water  Exercise/Regular meals: stays active with kids, does       CTS w/ mild weakness of thumb opposition, phalen positive    Tenderness right hip tenderness more than left        The following portions of the patient's history were reviewed and updated as appropriate: She  has a past medical history of Asthma (), Coronary artery disease (), Depression (), Heart valve disease (), Hypertension, Irritable bowel, Kidney stone (,,), Migraine (), and Psoriatic arthritis (HCC).  She   Patient Active Problem List    Diagnosis Date Noted     Generalized anxiety disorder 02/01/2024    Moderate episode of recurrent major depressive disorder (HCC) 01/05/2024    Psoriatic arthritis (HCC) 01/05/2024    Numbness and tingling 01/05/2024    Migraine 01/19/2001     She  has no past surgical history on file.  Her family history includes Asthma in her maternal grandmother, mother, and paternal grandmother; Cancer in her father; Diabetes in her father; Heart disease in her maternal grandmother and mother; Hypertension in her father and mother; Lung disease in her maternal grandmother, mother, and paternal grandmother; Migraines in her mother; Osteoarthritis in her maternal grandmother; Rashes / Skin problems in her mother; Stroke in her maternal grandmother; Thyroid disease in her father.  She  reports that she has been smoking cigarettes. She started smoking about 17 years ago. She has a 8.6 pack-year smoking history. She has never used smokeless tobacco. She reports that she does not drink alcohol and does not use drugs.  Current Outpatient Medications   Medication Sig Dispense Refill    ascorbic acid (VITAMIN C) 250 mg tablet Take 250 mg by mouth daily      Biotin 1000 MCG CHEW Chew 3 (three) times a day      buPROPion (WELLBUTRIN XL) 150 mg 24 hr tablet Take 1 tablet (150 mg total) by mouth every morning 90 tablet 0    Cyanocobalamin (B-12) 1000 MCG SUBL Place 1 tablet (1,000 mcg total) under the tongue in the morning 30 tablet 3    DULoxetine (Cymbalta) 30 mg delayed release capsule Take 1 capsule (30 mg total) by mouth daily 30 capsule 1    Levonorgestrel (Mirena, 52 MG,) 20 MCG/DAY IUD 1 each by Intrauterine route Once every 8 years      nabumetone (RELAFEN) 500 mg tablet Take 1 tablet (500 mg total) by mouth 2 (two) times a day 180 tablet 0    SUMAtriptan (Imitrex) 50 mg tablet Take 1 tablet (50 mg total) by mouth once as needed for migraine Can take a second dose in 2 hours if not completely effective. Do not take more than 2 doses in a day. Do not take  more than twice a week 9 tablet 4     Current Facility-Administered Medications   Medication Dose Route Frequency Provider Last Rate Last Admin    cyanocobalamin injection 1,000 mcg  1,000 mcg Intramuscular Q30 Days    1,000 mcg at 02/29/24 1413     Current Outpatient Medications on File Prior to Visit   Medication Sig    ascorbic acid (VITAMIN C) 250 mg tablet Take 250 mg by mouth daily    Biotin 1000 MCG CHEW Chew 3 (three) times a day    buPROPion (WELLBUTRIN XL) 150 mg 24 hr tablet Take 1 tablet (150 mg total) by mouth every morning    Levonorgestrel (Mirena, 52 MG,) 20 MCG/DAY IUD 1 each by Intrauterine route Once every 8 years    nabumetone (RELAFEN) 500 mg tablet Take 1 tablet (500 mg total) by mouth 2 (two) times a day     No current facility-administered medications on file prior to visit.     She has No Known Allergies..      Objective:        /80 (BP Location: Left arm, Patient Position: Sitting, Cuff Size: Large)   Pulse (!) 114   Wt 63.5 kg (140 lb)   LMP 02/09/2024   BMI 25.61 kg/m²       Physical Exam   Vitals reviewed.   Constitutional:    Not in acute distress. Normal appearance. Not ill-appearing, toxic-appearing or diaphoretic.   HENT:   Normocephalic and atraumatic.  External ear normal b/l. Nose normal. No congestion or rhinorrhea. Mucous membranes are moist.  Oropharynx is clear.   Eyes:    No scleral icterus.  No discharge b/l.  Conjunctivae normal. No obvious papilledema observed  Pulmonary:  Pulmonary effort is normal. No respiratory distress.   GI: abdomen not distended  Musculoskeletal: no gross deformities. Tenderness to palpation right trochanter greater than left trochanter  Skin:   Skin is not pale.   Psychiatric:       Mood normal. Affect congruent    Neurological Exam  Mental Status Alert and oriented to person, place, and time. Attention is normal. Speech is fluent and w/o dysarthria  Cranial Nerves  Visual fields full to confrontation. PERRL, brisk reactivity and  consensual response intact b/l. EOMI w/o CN VI or III palsy. No clear nystagmus. Facial sensation and expression full, symmetric. Hearing grossly symmetric. Palate symmetric. Trapezius strength symmetric. No dysarthria, tongue symmetric without atrophy or fasciculations   Motor Exam Muscle bulk grossly normal. Pronator drift absent b/l  Strength: R/L  Deltoid: 5/5    Biceps: 5/5   Triceps: 5/5  Wrist flexion: 5/5   Wrist extension: 5/5  Hand intrinsics: 5/5  Thumb opposition: 4+/4+ : 5/5    Iliopsoas: 5/5   Quads: 5/5   Hamstrin/5   TA: 5/5   Gastroc: 5/5     Sensory Exam Light touch, vibration, temperature intact and symmetric   Gait, Coordination, and Reflexes FTN normal. No tremor observed. Reflexes: R/L Brachioradialis: 2+/2+  Biceps: 2+/2+  Pateller: 2+/2+. Casual gait with average stance, stride length, arm swing. Tandem gait intact.       Review of Systems  Constitutional: Negative for chills, fever and unexpected weight change. Positive for fatigue  Eyes: positive for visual disturbance.   Respiratory: Negative for shortness of breath and wheezing.    Cardiovascular: Negative for chest pain.   Gastrointestinal: Negative for abdominal distention, abdominal pain, blood in stool, constipation, diarrhea, nausea and vomiting.   Endocrine: Negative for polyuria.   Genitourinary: Negative for dysuria and hematuria.   Neurological: Negative for dizziness, tremors, weakness, positive for pain and numbness and headaches.   Psychiatric/Behavioral: positive for sleep disturbance.           ~~~~~~~~~~~~~~~~~~~  Katherin Henry MD  Neurology Resident, PGY-4  [facility]

## 2024-03-01 DIAGNOSIS — E53.8 B12 DEFICIENCY: ICD-10-CM

## 2024-03-01 DIAGNOSIS — G43.109 MIGRAINE WITH AURA AND WITHOUT STATUS MIGRAINOSUS, NOT INTRACTABLE: ICD-10-CM

## 2024-03-01 NOTE — PATIENT INSTRUCTIONS
"Headache management instructions  - Maintain regular sleep schedule. Adults need at least 7-8 hours of uninterrupted a night.   - Limit over the counter medications such as Tylenol, Ibuprofen, Aleve, Excedrin. (No more than 2-3 times a week or max 10 times a month).  - Maintain headache diary.  Free CHARLINE for a smart phone, which can be used is \"Migraine teja\" or \"migraine diary\"  - Limit caffeine to 1-2 cups 8 to 16 oz a day or less and try to always take the same amount of caffeine daily if possible as deviation from this pattern may trigger a headache  - Avoid dietary triggers (aged cheese, peanuts, MSG, aspartame and nitrates).  - Try to have regular frequent meals to avoid triggering a headache  - Please drink at least 64 ounces of water a day to help maintain adequate hydration  - If an aura lasts longer than its typical time or if you have any new neurological symptoms (numbness/ tingling/ weakness), please see physician for evaluation.    Preventive therapy for headaches: Over-the-counter supplements to decrease intensity and frequency of migraines  - Magnesium Oxide 400mg a day.  If any diarrhea or upset stomach, decrease dose  as tolerated  - Vitamin B2/Riboflavin 200 mg twice a day. May cause the urine to turn yellow/orange which is normal for B 2 to do and is not a sign that you are dehydrated. Recommended to be taken with food so it is better absorbed. Also found in: Meats, Spinach, Nuts, Fish, Cheese, Legumes, Eggs, Whole grains, Milk, Yogurt  - Avoid cigarette smoking and carefully manage stroke risk factors such as hypertension, hyperlipidemia, diabetes    Basic neck exercises for daily use:  Neck pathology and poor posture, with straightening of the normal cervical lordosis, can cause headaches.  Tightening of the neck muscles can irritate the nerves in the occipital region of the head and cause or worsen head pain. Thus neck strengthening and relaxation exercises, can help improve this particular " pain. It is importance to have good posture for improving shoulder, neck, and head pain.  Here are some exercises which should take 5 minutes:     1. Standing, drop your head to one side while continuing to look ahead. Hold for 10 seconds then swap sides. Repeat twice more each side. To increase the stretch, drop the opposite shoulder.    2. Standing again, lower your chin to your chest, hold for 10 and then look up to the ceiling and hold for 10. Repeat twice more.     3. Next, standing straight again, look over your right shoulder and hold firm for 10 seconds, then over your left shoulder for 10. Repeat this 3 times.     4. Finally, while sitting upright, bring your head forward and hold for 10, then all the way back and hold for 10.    If this simple exercise does not help improve the posture, we will consider formal physical therapy in the future.      Importance of Healthy Sleep: Behavioral sleep changes can promote restful, regular sleep and reduce headache. Simple changes like establishing consistent sleep and wake-up times, as well as getting between 7 and 8 hours of sleep a day, can make a world of difference. Experts also recommend avoiding substances that impair sleep, like caffeine, nicotine and alcohol, and also suggest winding down before bed to prevent sleep problems. To read more go to https://americanmigrainefoundation.org/resource-library/sleep/  - If you are experiencing insomnia or symptoms suggestive of sleep apnea, we would recommend following up with Sleep Medicine for full evaluation. Sleep apnea, which many patients do not even know that they have, can impair cognitive function and memory, increase risk of stroke, increase blood pressure, and make it nearly impossible to lose weight, among many other negative effects    Exercise:  Regular exercise can reduce the frequency and intensity of headaches and migraines. When one exercises, the body releases endorphins, which are the body’s natural  painkillers. Exercise reduces stress and helps individuals to sleep at night. Exercising at least 30 to 40 minutes 3 times a week is sufficient for most patients.   When exercising, follow this plan to prevent headaches:  - First, stay hydrated before, during, and after exercise.    - Second part of the exercise plan is to eat sufficient food about an hour and a half before you exercise. Exercise causes one’s blood sugar level to decrease, and it is important to have a source of energy.   - Final part of the exercise plan is to warm-up. Do not jump into sudden, vigorous exercise if that triggers a headache or migraine.   To read more go to https://americanmigrainefoundation.org/resource-library/effects-of-exercise-on-headaches-and-migraines/    Other Vitamins and Additional Management options for patients with headaches:  Coenzyme Q10 Coenzyme Q10 is a fat soluble vitamin. Small amount of Vitamin E containing forms help its absorption. You can search internet for chewable and liquid forms   Found in Fish, Whole grains, Beef, Spinach, Soy, Peanuts, Mackerel, Sardines, Vegetable oil  Vitamin D can be increased with exposure to sunlight (15-20 minutes daily), Canned tuna, Fortified Milk, Fresh wild salmon, Fortified Orange Juice, Pickled Herring, Fortified Yogurts, Poached Catfish, Fortified cheese, Canned Sardines, Fortified breakfast cereals, Canned Mackerel, Cod liver oil (1tbsp)  Mindfulness/Meditation for Treating Migraine Stress can be a major trigger for headache or migraine. This is where mindfulness and meditation can come into play, as they have been known to help reduce migraine severity, duration and acute pain medication use. It may also help to relieve stress and anxiety while improving feelings of well being.  Biofeedback involves becoming more aware of the changes that occur in the body and learning how to exert control over generally involuntary functions. Biofeedback allows you to see your vitals in  real-time and learn how to stabilize them on your own. There is great evidence that biofeedback can reduce the frequency, intensity, and duration of migraine and tension-type headache. When you're stressed, you may notice elevated heart rates, tightened muscles, and sweating.  During biofeedback, you can see these changes on a monitor, then a therapist teaches you exercises to help manage these changes.  Yoga/Javon Chi for Treating Migraine The kind of mind/body therapy that yoga can provide may help create relief from migraine. Keeping up with yoga consistently can reduce headache frequency, intensity and duration for some p[atient, so it's important to practice regularly if you plan to use it as a complementary migraine treatment. However, certain types of yoga such as “hot yoga” may be uncomfortable for people with migraine. Others, such as “restorative yoga,” may be tolerable even for a patient with chronic migraine. Javon Chi can also have a similar benefit for patients with migraine. Specifically, it can help improve balance, which can be very useful for those with vestibular symptoms or vestibular migraine.

## 2024-03-02 NOTE — TELEPHONE ENCOUNTER
02-, 3:33:55 pm EST    Patient left voicemail stating pharmacy was having trouble with newly prescribed medication as  is not on medicaid approved provider list. Asking if alternate provider can call scripts in to pharmacy.

## 2024-03-04 RX ORDER — SUMATRIPTAN 50 MG/1
50 TABLET, FILM COATED ORAL ONCE AS NEEDED
Qty: 9 TABLET | Refills: 4 | Status: SHIPPED | OUTPATIENT
Start: 2024-03-04

## 2024-03-04 RX ORDER — DULOXETIN HYDROCHLORIDE 30 MG/1
30 CAPSULE, DELAYED RELEASE ORAL DAILY
Qty: 30 CAPSULE | Refills: 1 | Status: SHIPPED | OUTPATIENT
Start: 2024-03-04

## 2024-03-04 RX ORDER — MAGNESIUM 200 MG
1000 TABLET ORAL DAILY
Qty: 30 TABLET | Refills: 3 | Status: SHIPPED | OUTPATIENT
Start: 2024-03-04

## 2024-03-07 ENCOUNTER — APPOINTMENT (OUTPATIENT)
Dept: LAB | Facility: CLINIC | Age: 34
End: 2024-03-07
Payer: COMMERCIAL

## 2024-03-07 DIAGNOSIS — R20.0 NUMBNESS AND TINGLING: Primary | ICD-10-CM

## 2024-03-07 DIAGNOSIS — G62.9 NEUROPATHY: ICD-10-CM

## 2024-03-07 DIAGNOSIS — R51.9 NEW ONSET HEADACHE: ICD-10-CM

## 2024-03-07 DIAGNOSIS — R20.2 NUMBNESS AND TINGLING: Primary | ICD-10-CM

## 2024-03-07 LAB
ALBUMIN SERPL BCP-MCNC: 4.8 G/DL (ref 3.5–5)
ALP SERPL-CCNC: 63 U/L (ref 34–104)
ALT SERPL W P-5'-P-CCNC: 12 U/L (ref 7–52)
ANION GAP SERPL CALCULATED.3IONS-SCNC: 9 MMOL/L
AST SERPL W P-5'-P-CCNC: 16 U/L (ref 13–39)
BILIRUB SERPL-MCNC: 0.4 MG/DL (ref 0.2–1)
BUN SERPL-MCNC: 9 MG/DL (ref 5–25)
CALCIUM SERPL-MCNC: 9.8 MG/DL (ref 8.4–10.2)
CHLORIDE SERPL-SCNC: 104 MMOL/L (ref 96–108)
CO2 SERPL-SCNC: 27 MMOL/L (ref 21–32)
CREAT SERPL-MCNC: 0.67 MG/DL (ref 0.6–1.3)
CRP SERPL QL: <1 MG/L
ERYTHROCYTE [SEDIMENTATION RATE] IN BLOOD: 12 MM/HOUR (ref 0–19)
GFR SERPL CREATININE-BSD FRML MDRD: 117 ML/MIN/1.73SQ M
GLUCOSE P FAST SERPL-MCNC: 71 MG/DL (ref 65–99)
POTASSIUM SERPL-SCNC: 4.6 MMOL/L (ref 3.5–5.3)
PROT SERPL-MCNC: 7.5 G/DL (ref 6.4–8.4)
SODIUM SERPL-SCNC: 140 MMOL/L (ref 135–147)

## 2024-03-07 PROCEDURE — 84630 ASSAY OF ZINC: CPT

## 2024-03-07 PROCEDURE — 80053 COMPREHEN METABOLIC PANEL: CPT

## 2024-03-07 PROCEDURE — 84207 ASSAY OF VITAMIN B-6: CPT

## 2024-03-07 PROCEDURE — 36415 COLL VENOUS BLD VENIPUNCTURE: CPT

## 2024-03-07 PROCEDURE — 86618 LYME DISEASE ANTIBODY: CPT

## 2024-03-07 PROCEDURE — 86140 C-REACTIVE PROTEIN: CPT

## 2024-03-07 PROCEDURE — 85652 RBC SED RATE AUTOMATED: CPT

## 2024-03-08 LAB — B BURGDOR IGG+IGM SER QL IA: NEGATIVE

## 2024-03-09 LAB — ZINC SERPL-MCNC: 63 UG/DL (ref 44–115)

## 2024-03-10 LAB — VIT B6 SERPL-MCNC: 5.8 UG/L (ref 3.4–65.2)

## 2024-03-13 ENCOUNTER — HOSPITAL ENCOUNTER (OUTPATIENT)
Dept: ULTRASOUND IMAGING | Facility: HOSPITAL | Age: 34
Discharge: HOME | End: 2024-03-13
Payer: COMMERCIAL

## 2024-03-13 DIAGNOSIS — N92.0 MENORRHAGIA WITH REGULAR CYCLE: ICD-10-CM

## 2024-03-13 PROCEDURE — 76856 US EXAM PELVIC COMPLETE: CPT

## 2024-03-13 PROCEDURE — 76830 TRANSVAGINAL US NON-OB: CPT

## 2024-03-15 ENCOUNTER — HOSPITAL ENCOUNTER (OUTPATIENT)
Dept: MRI IMAGING | Facility: HOSPITAL | Age: 34
End: 2024-03-15
Payer: COMMERCIAL

## 2024-03-15 DIAGNOSIS — R51.9 NEW ONSET HEADACHE: ICD-10-CM

## 2024-03-15 DIAGNOSIS — R20.2 NUMBNESS AND TINGLING: ICD-10-CM

## 2024-03-15 DIAGNOSIS — R20.0 NUMBNESS AND TINGLING: ICD-10-CM

## 2024-03-15 PROCEDURE — 70544 MR ANGIOGRAPHY HEAD W/O DYE: CPT

## 2024-03-15 PROCEDURE — A9585 GADOBUTROL INJECTION: HCPCS

## 2024-03-15 PROCEDURE — 70553 MRI BRAIN STEM W/O & W/DYE: CPT

## 2024-03-15 RX ORDER — GADOBUTROL 604.72 MG/ML
6 INJECTION INTRAVENOUS
Status: COMPLETED | OUTPATIENT
Start: 2024-03-15 | End: 2024-03-15

## 2024-03-15 RX ADMIN — GADOBUTROL 6 ML: 604.72 INJECTION INTRAVENOUS at 17:32

## 2024-03-17 ENCOUNTER — HOSPITAL ENCOUNTER (OUTPATIENT)
Dept: MRI IMAGING | Facility: HOSPITAL | Age: 34
Discharge: HOME | End: 2024-03-17
Payer: COMMERCIAL

## 2024-03-17 DIAGNOSIS — R20.2 NUMBNESS AND TINGLING: ICD-10-CM

## 2024-03-17 DIAGNOSIS — M54.10 RADICULOPATHY: ICD-10-CM

## 2024-03-17 DIAGNOSIS — R20.0 NUMBNESS AND TINGLING: ICD-10-CM

## 2024-03-17 PROCEDURE — A9585 GADOBUTROL INJECTION: HCPCS | Performed by: PSYCHIATRY & NEUROLOGY

## 2024-03-17 PROCEDURE — 72156 MRI NECK SPINE W/O & W/DYE: CPT

## 2024-03-17 RX ORDER — GADOBUTROL 604.72 MG/ML
6 INJECTION INTRAVENOUS
Status: COMPLETED | OUTPATIENT
Start: 2024-03-17 | End: 2024-03-17

## 2024-03-17 RX ADMIN — GADOBUTROL 6 ML: 604.72 INJECTION INTRAVENOUS at 12:05

## 2024-03-18 ENCOUNTER — PROCEDURE VISIT (OUTPATIENT)
Dept: NEUROLOGY | Facility: CLINIC | Age: 34
End: 2024-03-18
Payer: COMMERCIAL

## 2024-03-18 DIAGNOSIS — R20.0 NUMBNESS AND TINGLING: ICD-10-CM

## 2024-03-18 DIAGNOSIS — R20.2 NUMBNESS AND TINGLING: ICD-10-CM

## 2024-03-18 PROCEDURE — 95911 NRV CNDJ TEST 9-10 STUDIES: CPT | Performed by: PHYSICAL MEDICINE & REHABILITATION

## 2024-03-18 PROCEDURE — 95886 MUSC TEST DONE W/N TEST COMP: CPT | Performed by: PHYSICAL MEDICINE & REHABILITATION

## 2024-03-19 DIAGNOSIS — N92.0 MENORRHAGIA WITH REGULAR CYCLE: Primary | ICD-10-CM

## 2024-03-19 RX ORDER — TRANEXAMIC ACID 650 MG/1
1300 TABLET ORAL 3 TIMES DAILY
Qty: 15 TABLET | Refills: 0 | Status: SHIPPED | OUTPATIENT
Start: 2024-03-19 | End: 2024-03-24

## 2024-03-21 ENCOUNTER — TELEPHONE (OUTPATIENT)
Dept: NEUROLOGY | Facility: CLINIC | Age: 34
End: 2024-03-21

## 2024-03-21 NOTE — TELEPHONE ENCOUNTER
3/18/24, 12:31    Hi, this is Svetlana calling from [facility] office on patient Beatriz Otto. Medical record [id number], date of birth [birth year]. She got results on an EMG, which are not her studies. So the wrong results were sent into her chart today as a procedure. The procedure is actually on a Madhu Otto. So I'm going to do a chart correction. If you could look for her results, that would be great and send them back to us. Otherwise, I'm just going to do a chart correction to get those out of her chart. And you can send the other results to us. Thank you. Paulo Hdz [phone]    EMG results faxed to PCP, [facility] E Carpenter's office at [phone]

## 2024-03-22 ENCOUNTER — TELEPHONE (OUTPATIENT)
Dept: NEUROLOGY | Facility: CLINIC | Age: 34
End: 2024-03-22

## 2024-03-22 NOTE — TELEPHONE ENCOUNTER
received vm 3/19 at 4:09pm-A hello, my name is Jeanne joaquin, date of birth [birth year]. I'm calling to discuss the test results and new symptoms that I started having and a couple other questions that I have for the doctor. If you could give me a call back at [phone]. Thank you.  ---------------------------------------------  Called pt.  States that she has n/t in arms and legs but she started to have n/t  to the Back of her head, throat and her mouth-this began about a week ago.  She thought it was related to the migraine she had but it is now happening even when she doesn't have a headache.  This comes and goes. Last usually at least an hour or can last longer.  This never happened before.   Has difficulty eating due to the sensation in her throat and mouth    States that she saw MRI results and wanted to speak about the results.  States that she dad had nasal pharyngeal cancer     I also added dr whitt as a team member so that she may be able to send [employer] messages.  She states that only her pcp was listed as a provider that she could send messages to.     Please advise    [phone] to leave detailed message or ok to send mychart message

## 2024-03-27 ENCOUNTER — OFFICE VISIT (OUTPATIENT)
Dept: RHEUMATOLOGY | Facility: CLINIC | Age: 34
End: 2024-03-27
Payer: COMMERCIAL

## 2024-03-27 ENCOUNTER — TELEPHONE (OUTPATIENT)
Dept: OTHER | Facility: HOSPITAL | Age: 34
End: 2024-03-27

## 2024-03-27 VITALS
BODY MASS INDEX: 26.57 KG/M2 | WEIGHT: 144.4 LBS | OXYGEN SATURATION: 99 % | SYSTOLIC BLOOD PRESSURE: 128 MMHG | HEIGHT: 62 IN | DIASTOLIC BLOOD PRESSURE: 80 MMHG | HEART RATE: 124 BPM | TEMPERATURE: 96.2 F

## 2024-03-27 DIAGNOSIS — M54.89 INFLAMMATORY BACK PAIN: ICD-10-CM

## 2024-03-27 DIAGNOSIS — M70.61 TROCHANTERIC BURSITIS OF BOTH HIPS: ICD-10-CM

## 2024-03-27 DIAGNOSIS — J34.1 MUCOUS RETENTION CYST OF MAXILLARY SINUS: ICD-10-CM

## 2024-03-27 DIAGNOSIS — M54.16 LUMBAR RADICULOPATHY: ICD-10-CM

## 2024-03-27 DIAGNOSIS — K21.9 ACID REFLUX: ICD-10-CM

## 2024-03-27 DIAGNOSIS — Z79.899 HIGH RISK MEDICATION USE: ICD-10-CM

## 2024-03-27 DIAGNOSIS — R13.10 DYSPHAGIA: Primary | ICD-10-CM

## 2024-03-27 DIAGNOSIS — M54.9 BACK PAIN: ICD-10-CM

## 2024-03-27 DIAGNOSIS — M70.62 TROCHANTERIC BURSITIS OF BOTH HIPS: ICD-10-CM

## 2024-03-27 DIAGNOSIS — M54.2 CERVICALGIA: ICD-10-CM

## 2024-03-27 DIAGNOSIS — L40.50 PSORIATIC ARTHRITIS (HCC): Primary | ICD-10-CM

## 2024-03-27 DIAGNOSIS — L40.9 SCALP PSORIASIS: ICD-10-CM

## 2024-03-27 DIAGNOSIS — M25.50 POLYARTHRALGIA: ICD-10-CM

## 2024-03-27 PROCEDURE — 99205 OFFICE O/P NEW HI 60 MIN: CPT | Performed by: PHYSICIAN ASSISTANT

## 2024-03-27 RX ORDER — PREDNISONE 5 MG/1
TABLET ORAL
Qty: 50 TABLET | Refills: 0 | Status: SHIPPED | OUTPATIENT
Start: 2024-03-27

## 2024-03-27 NOTE — PROGRESS NOTES
Assessment and Plan:  Ms. Otto is a 33-year-old female with past medical history significant for psoriatic arthritis, scalp psoriasis, keratoconus OS, MARGARITO, MDD, valvular disease, and migraine headaches who presents for rheumatology evaluation of psoriatic arthritis.    Beatriz has been experiencing flaking and itching of the scalp as well as inflammatory joint pain for many years.  She experiences dactylitis of the bilateral fourth fingers and appears to have an element of inflammatory back pain.  Apparently 1.5 to 2 years ago she was evaluated by rheumatology in [place] (Dr. Davis) and diagnosed with psoriatic arthritis.  She has trialed multiple medications including nabumetone, Advil, Xeljanz (GI upset) and Humira (only experienced benefit for 1 week after injection).  She has relocated to this area and is looking to transfer care.    The patient is still experiencing symptoms of inflammatory polyarthritis as well as scalp psoriasis.  We discussed that in light of her history of MARGARITO and MDD, will avoid Otezla due to potential worsening of mental health conditions with this medication.  Since she prefers an oral formulation rather than injectable at this time, the next option would be Rinvoq 15 QD. Benefits and risks of TOMER inhibitor biologic agents like Rinvoq were discussed, and include but are not limited to reactivation of hepatitis B/C or TB, diverticular perforation, hyperlipidemia, hepatotoxicity, VTE, and increased risk of infections.  We will update high risk medication monitoring labs every 3 months for toxicity monitoring and she will have these done prior to the next visit.    I will reach out to her previous rheumatologist to obtain records including labs and imaging to ensure that she has had a chronic hepatitis panel and QuantiFERON gold within the last 2 years.  I will reach out to the patient to let her know if she requires any further testing in the interim.  To help with the acute symptoms,  I have prescribed a prednisone taper.  Additionally, the patient has symptoms of bilateral greater trochanteric bursitis.  I have provided a handout with exercises that she can do to mitigate the symptoms.    Follow-up in 3 months      Plan:  Diagnoses and all orders for this visit:    Psoriatic arthritis (HCC)  -     Ambulatory Referral to Rheumatology  -     CBC and differential; Future  -     C-reactive protein; Future  -     Comprehensive metabolic panel; Future  -     Sedimentation rate, automated; Future  -     predniSONE 5 mg tablet; 4 tabs x5 days, then 3 tabs x5 days, then 2 tabs x5 days, then 1 tab x5 days, then stop.    Scalp psoriasis  -     predniSONE 5 mg tablet; 4 tabs x5 days, then 3 tabs x5 days, then 2 tabs x5 days, then 1 tab x5 days, then stop.    Trochanteric bursitis of both hips    Inflammatory back pain    Polyarthralgia    High risk medication use  -     CBC and differential; Future  -     Comprehensive metabolic panel; Future        I have personally reviewed prior notes, recent laboratory results, and pertinent films in PACS.     Activities as tolerated.   Exercise: try to maintain a low impact exercise regimen as much as possible. Walk for 30 minutes a day for at least 3 days a week.   Continue other medications as prescribed by PCP and other specialists.       RTC in 3 months      Follow-up plan: 3 months        Chief Complaint  No chief complaint on file.      MARK Otto is a 33 y.o.  female who presents as a Rheumatology consult referred by Maria E Carpenter P* for evaluation of psoriatic arthritis.    The patient reports that she has been experiencing flaking and itching of the scalp as well as joint pain for many years.  Per report, she experiences dactylitis of the bilateral fourth fingers.  Apparently 1.5 to 2 years ago she was evaluated by rheumatology in [place] (Dr. Davis) and diagnosed with psoriatic arthritis.  She has trialed multiple medications including  nabumetone, Advil, Xeljanz (GI upset) and Humira (only experienced benefit for 1 week after injection).  She has relocated to this area and is looking to transfer care.    Presently, she is experiencing pain in the bilateral hands, feet, hips, and knees.  She also has pain in the neck and the lower back.  She notices swelling of the hands, ankles, and feet.  She experiences stiffness of the peripheral joints in the back for at least 2 hours in the morning.  She has also been experiencing tingling in the hands and the wrists for 3 years and is awaiting EMG    She notes scalp psoriasis that worsened with Selsun Blue.  She does not notice psoriasis on other areas of her body    Pertinent positives: Fatigue, fevers, night sweats, dry mouth, chest pain, shortness of breath, cough (recently had bronchitis), GERD, diarrhea, constipation     Family history of RA and ulcerative colitis in her mother, maternal grandmother with osteoarthritis    Denies dry eyes, inflammatory eye disease, persistent/recurrent skin rash, photosensitivity, mouth/nose ulcers, swollen glands, pleuritic chest pain, blood in stools, inflammatory bowel disease, blood clots, miscarriages, or Raynaud's symptoms.      Review of Systems  Review of Systems  Constitutional: +fevers, night sweats, fatigue.  ENT/Mouth: Negative for hearing changes, ear pain, nasal congestion, sinus pain, hoarseness, sore throat, rhinorrhea, swallowing difficulty.   Eyes: Negative for pain, redness, discharge, vision changes.   Cardiovascular: + chest pain, SOB.   Respiratory: +cough   Gastrointestinal: + diarrhea, constipation, pain, heartburn.  Genitourinary: Negative for dysuria, urinary frequency, hematuria.   Musculoskeletal: As per HPI.  Skin: Negative for skin rash, color changes.   Neuro: + Numbness, tingling.    Psych: +anxiety, depression.   Heme/Lymph: Negative for easy bruising, bleeding, lymphadenopathy.      Allergies  No Known Allergies    Home  Medications    Current Outpatient Medications:     ascorbic acid (VITAMIN C) 250 mg tablet, Take 250 mg by mouth daily, Disp: , Rfl:     Biotin 1000 MCG CHEW, Chew 3 (three) times a day, Disp: , Rfl:     buPROPion (WELLBUTRIN XL) 150 mg 24 hr tablet, Take 1 tablet (150 mg total) by mouth every morning, Disp: 90 tablet, Rfl: 0    Cyanocobalamin (B-12) 1000 MCG SUBL, Place 1 tablet (1,000 mcg total) under the tongue in the morning, Disp: 30 tablet, Rfl: 3    DULoxetine (Cymbalta) 30 mg delayed release capsule, Take 1 capsule (30 mg total) by mouth daily, Disp: 30 capsule, Rfl: 1    Levonorgestrel (Mirena, 52 MG,) 20 MCG/DAY IUD, 1 each by Intrauterine route Once every 8 years, Disp: , Rfl:     nabumetone (RELAFEN) 500 mg tablet, Take 1 tablet (500 mg total) by mouth 2 (two) times a day, Disp: 180 tablet, Rfl: 0    predniSONE 5 mg tablet, 4 tabs x5 days, then 3 tabs x5 days, then 2 tabs x5 days, then 1 tab x5 days, then stop., Disp: 50 tablet, Rfl: 0    SUMAtriptan (Imitrex) 50 mg tablet, Take 1 tablet (50 mg total) by mouth once as needed for migraine Can take a second dose in 2 hours if not completely effective. Do not take more than 2 doses in a day. Do not take more than twice a week, Disp: 9 tablet, Rfl: 4    Current Facility-Administered Medications:     cyanocobalamin injection 1,000 mcg, 1,000 mcg, Intramuscular, Q30 Days, , 1,000 mcg at 02/29/24 1413    Past Medical History  Past Medical History:   Diagnosis Date    Asthma 1998    [place] asthma    Coronary artery disease 2015    2 trace leaks in valves    Depression 2004    Heart valve disease 2015    Hypertension     Irritable bowel     Kidney stone 2008,2011,2022    Migraine 2001    Psoriatic arthritis (HCC)        Past Surgical History   History reviewed. No pertinent surgical history.    Family History    Family History   Problem Relation Age of Onset    Asthma Mother     Heart disease Mother     Hypertension Mother     Lung disease Mother     Migraines  "Mother     Rashes / Skin problems Mother     Cancer Father     Diabetes Father     Hypertension Father     Thyroid disease Father     Asthma Maternal Grandmother     Heart disease Maternal Grandmother     Lung disease Maternal Grandmother     Osteoarthritis Maternal Grandmother     Stroke Maternal Grandmother     Asthma Paternal Grandmother     Lung disease Paternal Grandmother        Social History  Social History     Substance and Sexual Activity   Alcohol Use Never     Social History     Substance and Sexual Activity   Drug Use Never     Social History     Tobacco Use   Smoking Status Every Day    Current packs/day: 0.50    Average packs/day: 0.5 packs/day for 17.2 years (8.6 ttl pk-yrs)    Types: Cigarettes    Start date: 1/1/2007   Smokeless Tobacco Never       Objective:  Vitals:    03/27/24 1004   BP: 128/80   Pulse: (!) 124   Temp: (!) 96.2 °F (35.7 °C)   SpO2: 99%   Weight: 65.5 kg (144 lb 6.4 oz)   Height: 5' 2\" (1.575 m)       Physical Exam  General: Well appearing, well nourished, in no acute distress. Oriented x 3, normal mood and affect.  Ambulating without difficulty.  Skin: Good turgor, no rash, unusual bruising or prominent lesions.  Hair: Normal texture and distribution.  Nails: + Nail psoriasis  HEENT:  Head: Normocephalic, atraumatic.  Eyes: Conjunctiva clear, sclera non-icteric, EOM intact.  Nose: No external lesions, mucosa non-inflamed.  Mouth: Mucous membranes moist, no mucosal lesions.  Neck: Supple  Musculoskeletal:   + Tenderness of the bilateral greater trochanteric areas  No asymmetry or deformities noted of bilateral upper and lower extremities.  No swelling of joints bilaterally to include: shoulder, elbow, wrist, MCP I-V, PIP I-V, knee, ankle, MTP I-V.  Neurologic: Alert and oriented. No focal neurological deficits appreciated.   Psychiatric: Normal mood and affect.       Reviewed labs and imaging.    Imaging:   US pelvis complete w transvaginal    Result Date: 3/18/2024  Narrative: " PELVIC ULTRASOUND, COMPLETE INDICATION: The patient is 33 years old. N92.0: Excessive and frequent menstruation with regular cycle. COMPARISON: None TECHNIQUE: Transabdominal pelvic ultrasound was performed in sagittal and transverse planes with a curvilinear transducer. Additional transvaginal imaging was performed to better evaluate the endometrium and ovaries. Imaging included volumetric sweeps as  well as traditional still imaging technique. FINDINGS: UTERUS: The uterus is anteverted in position, measuring 10.9 x 4.3 x 7.1 cm. The uterus has a normal contour and echotexture. Nabothian cyst(s) present, cervix otherwise within normal limits. ENDOMETRIUM: The endometrial echo complex has an AP caliber of 7.0 mm. This is within normal limits for a premenopausal female. Areas inserted along the endometrial stripe and appears to be in appropriate position. OVARIES/ADNEXA: Right ovary: 2.8 x 2.5 x 1.7 cm. 6.4 mL. Ovarian Doppler flow is within normal limits. No suspicious ovarian or adnexal abnormality. Left ovary: 2.7 x 2.6 x 2.0 cm. 7.3 mL. Ovarian Doppler flow is within normal limits. No suspicious ovarian or adnexal abnormality. OTHER: No free fluid or loculated fluid collections.     Impression: 1. IUD is centered along the endometrial stripe and appears to be in appropriate position. 2. Unremarkable sonographic appearance of the uterus and ovaries. Workstation performed: [id number]     MRI cervical spine with and without contrast    Result Date: 3/18/2024  Narrative: MRI CERVICAL SPINE WITH AND WITHOUT CONTRAST INDICATION: R20.0: Anesthesia of skin R20.2: Paresthesia of skin M54.10: Radiculopathy, site unspecified.    radiculopathy, psoriatic arthritis, hand weakness numbness COMPARISON: MRI brain with and without contrast + MRA head without contrast 3/15/2024. TECHNIQUE:  Multiplanar, multisequence imaging of the cervical spine was performed before and after gadolinium administration. . IV Contrast:  6 mL of  Gadobutrol injection (SINGLE-DOSE) IMAGE QUALITY:  Diagnostic. FINDINGS: ALIGNMENT: Mild reversal of cervical lordosis.  No compression fracture.  No subluxation.  No scoliosis. MARROW SIGNAL:  Normal marrow signal is identified within the visualized bony structures.  No discrete marrow lesion. CERVICAL AND VISUALIZED UPPER THORACIC CORD:  Normal signal within the visualized cord. PREVERTEBRAL AND PARASPINAL SOFT TISSUES:  Normal. VISUALIZED POSTERIOR FOSSA: Please see MRI brain with and without contrast 3/15/2024 for further evaluation. CERVICAL DISC SPACES: C1-C2: Normal. C3-C4: Normal. C4-C5: Normal. C5-C6: Small right paracentral disc protrusion contacting right ventral cord. Mild canal stenosis. No significant foraminal narrowing. C6-C7: Tiny right paracentral disc protrusion. Minor canal stenosis. No significant foraminal narrowing. C7-T1: Normal. UPPER THORACIC DISC SPACES:  Normal. POSTCONTRAST IMAGING:  Normal. OTHER FINDINGS:  None.     Impression: No acute abnormality of cervical spine. Early degenerative disc disease of cervical spine with varying degrees of canal stenosis (mild C5-C6), as detailed above. No significant foraminal narrowing. Workstation performed: [id number]     MRI angiogram head without contrast    Result Date: 3/18/2024  Narrative: MRA BRAIN WITHOUT CONTRAST INDICATION:  R20.0: Anesthesia of skin R20.2: Paresthesia of skin R51.9: Headache, unspecified new onset headache, focal neurologic symptoms, family history of fatal aneurysmal rupture COMPARISON: Same day MRI brain and MRI cervical spine with and without contrast. TECHNIQUE:  Axial 3-D time-of-flight imaging with 3-D reconstructions performed without contrast. IV Contrast:  Not administered. FINDINGS: IMAGE QUALITY:  Diagnostic. ANATOMY INTERNAL CAROTID ARTERIES:  Normal flow related signal of the distal cervical, petrous and cavernous segments of the internal carotid arteries.  Normal ICA terminus. ANTERIOR CIRCULATION:  Normal  A1 segments.  Normal anterior communicating artery.  Normal flow-related signal of the anterior cerebral arteries. MIDDLE CEREBRAL ARTERY CIRCULATION:  The M1 segment and middle cerebral artery branches demonstrate normal flow-related signal. DISTAL VERTEBRAL ARTERIES:  Distal vertebral arteries are patent with a normal vertebrobasilar junction. Right posterior inferior cerebellar artery and left AICA-PICA origins are normal. BASILAR ARTERY:  Normal SUPERIOR CEREBELLAR ARTERIES: Duplicated right and single left superior cerebellar arteries demonstrate normal flow-related signal, normal variant. POSTERIOR CEREBRAL ARTERIES:  Both posterior cerebral arteries arises from the basilar tip.  Both arteries demonstrate normal flow-related signal.  Patent posterior communicating arteries. No intracranial aneurysm identified.     Impression: Normal MRA Brain. No intracranial aneurysm, as clinically questioned. Workstation performed: [id number]     MRI brain with and without contrast    Result Date: 3/18/2024  Narrative: MRI BRAIN WITH AND WITHOUT CONTRAST INDICATION: R20.0: Anesthesia of skin R20.2: Paresthesia of skin R51.9: Headache, unspecified. new onset headache, focal neurologic symptoms, family history of fatal aneurysmal rupture COMPARISON: Same day MRA head without contrast and MRI cervical spine with and without contrast. TECHNIQUE: Multiplanar, multisequence imaging of the brain was performed before and after gadolinium administration. IV Contrast:  6 mL of Gadobutrol injection (SINGLE-DOSE) IMAGE QUALITY:   Diagnostic. FINDINGS: BRAIN PARENCHYMA:  There is no discrete mass, mass effect or midline shift. There is no intracranial hemorrhage.  Normal posterior fossa.  Diffusion imaging is unremarkable. No acute infarction. There are no white matter changes in the cerebral hemispheres. Postcontrast imaging of the brain demonstrates no abnormal enhancement. VENTRICLES:  Normal for the patient's age. SELLA AND  PITUITARY GLAND:  Normal. ORBITS:  Normal. PARANASAL SINUSES: Small right maxillary mucous retention cyst. VASCULATURE:  Evaluation of the major intracranial vasculature demonstrates appropriate flow voids. Please see same day MRA head without contrast. CALVARIUM AND SKULL BASE:  Normal. EXTRACRANIAL SOFT TISSUES:  Normal.     Impression: Normal MRI of the brain. Workstation performed: [id number]        Labs:   Appointment on 03/07/2024   Component Date Value Ref Range Status    Sodium 03/07/2024 140  135 - 147 mmol/L Final    Potassium 03/07/2024 4.6  3.5 - 5.3 mmol/L Final    Chloride 03/07/2024 104  96 - 108 mmol/L Final    CO2 03/07/2024 27  21 - 32 mmol/L Final    ANION GAP 03/07/2024 9  mmol/L Final    BUN 03/07/2024 9  5 - 25 mg/dL Final    Creatinine 03/07/2024 0.67  0.60 - 1.30 mg/dL Final    Standardized to IDMS reference method    Glucose, Fasting 03/07/2024 71  65 - 99 mg/dL Final    Calcium 03/07/2024 9.8  8.4 - 10.2 mg/dL Final    AST 03/07/2024 16  13 - 39 U/L Final    ALT 03/07/2024 12  7 - 52 U/L Final    Specimen collection should occur prior to Sulfasalazine administration due to the potential for falsely depressed results.     Alkaline Phosphatase 03/07/2024 63  34 - 104 U/L Final    Total Protein 03/07/2024 7.5  6.4 - 8.4 g/dL Final    Albumin 03/07/2024 4.8  3.5 - 5.0 g/dL Final    Total Bilirubin 03/07/2024 0.40  0.20 - 1.00 mg/dL Final    Use of this assay is not recommended for patients undergoing treatment with eltrombopag due to the potential for falsely elevated results.  N-acetyl-p-benzoquinone imine (metabolite of Acetaminophen) will generate erroneously low results in samples for patients that have taken an overdose of Acetaminophen.    eGFR 03/07/2024 117  ml/min/1.73sq m Final    Zinc 03/07/2024 63  44 - 115 ug/dL Final                                    Detection Limit = 5    Vitamin B6 03/07/2024 5.8  3.4 - 65.2 ug/L Final                                 Deficiency:         <3.4                                Marginal:      3.4 - 5.1                               Adequate:           >5.1    Sed Rate 03/07/2024 12  0 - 19 mm/hour Final    CRP 03/07/2024 <1.0  <3.0 mg/L Final    Lyme Total Antibodies 03/07/2024 Negative  Negative Final   Appointment on 01/05/2024   Component Date Value Ref Range Status    TSH 3RD GENERATON 01/05/2024 1.836  0.450 - 4.500 uIU/mL Final    The recommended reference ranges for TSH during pregnancy are as follows:   First trimester 0.100 to 2.500 uIU/mL   Second trimester  0.200 to 3.000 uIU/mL   Third trimester 0.300 to 3.000 uIU/m    Note: Normal ranges may not apply to patients who are transgender, non-binary, or whose legal sex, sex at birth, and gender identity differ.  Adult TSH (3rd generation) reference range follows the recommended guidelines of the American [employer] Association, January, 2020.    Vit D, 1,25-Dihydroxy 01/05/2024 32.0  24.8 - 81.5 pg/mL Final    Cholesterol 01/05/2024 193  See Comment mg/dL Final    Cholesterol:         Pediatric <18 Years        Desirable          <170 mg/dL      Borderline High    170-199 mg/dL      High               >=200 mg/dL        Adult >=18 Years            Desirable         <200 mg/dL      Borderline High   200-239 mg/dL      High              >239 mg/dL      Triglycerides 01/05/2024 282 (H)  See Comment mg/dL Final    Triglyceride:     0-9Y            <75mg/dL     10Y-17Y         <90 mg/dL       >=18Y     Normal          <150 mg/dL     Borderline High 150-199 mg/dL     High            200-499 mg/dL        Very High       >499 mg/dL    Specimen collection should occur prior to Metamizole administration due to the potential for falsely depressed results.    HDL, Direct 01/05/2024 46 (L)  >=50 mg/dL Final    LDL Calculated 01/05/2024 91  0 - 100 mg/dL Final    LDL Cholesterol:     Optimal           <100 mg/dl     Near Optimal      100-129 mg/dl     Above Optimal       Borderline High 130-159 mg/dl       High             160-189 mg/dl       Very High       >189 mg/dl         This screening LDL is a calculated result.   It does not have the accuracy of the Direct Measured LDL in the monitoring of patients with hyperlipidemia and/or statin therapy.   Direct Measure LDL (LFO282) must be ordered separately in these patients.    Non-HDL-Chol (CHOL-HDL) 01/05/2024 147  mg/dl Final    Vitamin B-12 01/05/2024 323  180 - 914 pg/mL Final    Folate 01/05/2024 16.3  >5.9 ng/mL Final    The World Health Organization has determined deficient folate concentrations are considered to be <4.0 ng/mL.    Iron Saturation 01/05/2024 11 (L)  15 - 50 % Final    TIBC 01/05/2024 416  250 - 450 ug/dL Final    Iron 01/05/2024 46 (L)  50 - 212 ug/dL Final    Patients treated with metal-binding drugs (ie. Deferoxamine) may have depressed iron values.    UIBC 01/05/2024 370 (H)  155 - 355 ug/dL Final    Ferritin 01/05/2024 12  11 - 307 ng/mL Final   Admission on 01/02/2024, Discharged on 01/02/2024   Component Date Value Ref Range Status    WBC 01/02/2024 9.91  4.31 - 10.16 Thousand/uL Final    RBC 01/02/2024 5.42 (H)  3.81 - 5.12 Million/uL Final    Hemoglobin 01/02/2024 15.5 (H)  11.5 - 15.4 g/dL Final    Hematocrit 01/02/2024 47.4 (H)  34.8 - 46.1 % Final    MCV 01/02/2024 88  82 - 98 fL Final    MCH 01/02/2024 28.6  26.8 - 34.3 pg Final    MCHC 01/02/2024 32.7  31.4 - 37.4 g/dL Final    RDW 01/02/2024 12.3  11.6 - 15.1 % Final    MPV 01/02/2024 8.9  8.9 - 12.7 fL Final    Platelets 01/02/2024 262  149 - 390 Thousands/uL Final    nRBC 01/02/2024 0  /100 WBCs Final    Neutrophils Relative 01/02/2024 73  43 - 75 % Final    Immature Grans % 01/02/2024 0  0 - 2 % Final    Lymphocytes Relative 01/02/2024 19  14 - 44 % Final    Monocytes Relative 01/02/2024 6  4 - 12 % Final    Eosinophils Relative 01/02/2024 2  0 - 6 % Final    Basophils Relative 01/02/2024 0  0 - 1 % Final    Neutrophils Absolute 01/02/2024 7.21  1.85 - 7.62 Thousands/µL Final    Absolute  "Immature Grans 01/02/2024 0.02  0.00 - 0.20 Thousand/uL Final    Absolute Lymphocytes 01/02/2024 1.89  0.60 - 4.47 Thousands/µL Final    Absolute Monocytes 01/02/2024 0.59  0.17 - 1.22 Thousand/µL Final    Eosinophils Absolute 01/02/2024 0.16  0.00 - 0.61 Thousand/µL Final    Basophils Absolute 01/02/2024 0.04  0.00 - 0.10 Thousands/µL Final    Sodium 01/02/2024 137  135 - 147 mmol/L Final    Potassium 01/02/2024 3.6  3.5 - 5.3 mmol/L Final    Chloride 01/02/2024 105  96 - 108 mmol/L Final    CO2 01/02/2024 26  21 - 32 mmol/L Final    ANION GAP 01/02/2024 6  mmol/L Final    BUN 01/02/2024 10  5 - 25 mg/dL Final    Creatinine 01/02/2024 0.69  0.60 - 1.30 mg/dL Final    Standardized to IDMS reference method    Glucose 01/02/2024 84  65 - 140 mg/dL Final    If the patient is fasting, the ADA then defines impaired fasting glucose as > 100 mg/dL and diabetes as > or equal to 123 mg/dL.    Calcium 01/02/2024 9.3  8.4 - 10.2 mg/dL Final    AST 01/02/2024 14  13 - 39 U/L Final    ALT 01/02/2024 20  7 - 52 U/L Final    Specimen collection should occur prior to Sulfasalazine administration due to the potential for falsely depressed results.     Alkaline Phosphatase 01/02/2024 65  34 - 104 U/L Final    Total Protein 01/02/2024 7.0  6.4 - 8.4 g/dL Final    Albumin 01/02/2024 4.4  3.5 - 5.0 g/dL Final    Total Bilirubin 01/02/2024 0.54  0.20 - 1.00 mg/dL Final    Use of this assay is not recommended for patients undergoing treatment with eltrombopag due to the potential for falsely elevated results.  N-acetyl-p-benzoquinone imine (metabolite of Acetaminophen) will generate erroneously low results in samples for patients that have taken an overdose of Acetaminophen.    eGFR 01/02/2024 116  ml/min/1.73sq m Final    hs TnI 0hr 01/02/2024 <2  \"Refer to ACS Flowchart\"- see link ng/L Final    Comment:                                              Initial (time 0) result  If >=50 ng/L, Myocardial injury suggested ;  Type of myocardial " injury and treatment strategy  to be determined.  If 5-49 ng/L, a delta result at 2 hours and or 4 hours will be needed to further evaluate.  If <4 ng/L, and chest pain has been >3 hours since onset, patient may qualify for discharge based on the HEART score in the ED.  If <5 ng/L and <3hours since onset of chest pain, a delta result at 2 hours will be needed to further evaluate.    HS Troponin 99th Percentile URL of a Health Population=12 ng/L with a 95% Confidence Interval of 8-18 ng/L.    Second Troponin (time 2 hours)  If calculated delta >= 20 ng/L,  Myocardial injury suggested ; Type of myocardial injury and treatment strategy to be determined.  If 5-49 ng/L and the calculated delta is 5-19 ng/L, consult medical service for evaluation.  Continue evaluation for ischemia on ecg and other possible etiology and repeat hs troponin at 4 hours.  If delta                            is <5 ng/L at 2 hours, consider discharge based on risk stratification via the HEART score (if in ED), or PAUL risk score in IP/Observation.    HS Troponin 99th Percentile URL of a Health Population=12 ng/L with a 95% Confidence Interval of 8-18 ng/L.    Magnesium 01/02/2024 1.8 (L)  1.9 - 2.7 mg/dL Final         Osiel Melara PA-C  Rheumatology

## 2024-03-27 NOTE — TELEPHONE ENCOUNTER
Called patient to address her symptoms and MRI findings - patient reports that her father had nasopharyngeal carcinoma which appeared to be a cyst in imaging prior.    She also reports worsening of her numbness and tingling symptoms though she has now been to her rheumatologist who started her on prednisone taper until they are able to get her psoriatic arthritis medication approved. She reports that her dysphagia seems to be primarily related to her acid reflux and is relatively intermittent at this time    Given her ongoing paresthesias and headaches, reasonable to up-titrate her Cymbalta to 60 mg daily (which she believes may have helped at little at the 30 mg dose). 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 Will trial famotidine for reflux and consider a VBS if patient's dysphagia does not improve with that intervention.    Patient is now also describing radicular symptoms and given her rheumatologist's recommendations and known psoriatic arthritis would like MRI LS to further evaluate. For back an neck pain management will refer to PT and OMT.    All questions and concerns addressed and patient amenable to the abovementioned plan

## 2024-03-28 ENCOUNTER — TELEPHONE (OUTPATIENT)
Dept: RHEUMATOLOGY | Facility: CLINIC | Age: 34
End: 2024-03-28

## 2024-03-28 DIAGNOSIS — Z00.6 ENCOUNTER FOR EXAMINATION FOR NORMAL COMPARISON OR CONTROL IN CLINICAL RESEARCH PROGRAM: ICD-10-CM

## 2024-03-28 NOTE — TELEPHONE ENCOUNTER
Rheumatology Prior Authorization Request      Medication/Disease Information:   Diagnosis: Psoriatic arthritis, inflammatory back pain, scalp psoriasis  Medication:  Rinvoq 15mg QD  Failed or Intolerant to or Contraindicated:  nabumetone, Advil, Xeljanz, and Humira.  Cannot take methotrexate due to female of childbearing age  Screening  Renal function:  pending  Hepatic function: pending  Hepatitis B:  pending  Hepatitis C: pending  Tuberculosis: pending     Osiel Melara PA-C  NPI: [id number]  Lic: [id number]    Supervising Physician: Dana Silva MD  NPI: [id number]  DEBORAH: [id number]

## 2024-03-28 NOTE — TELEPHONE ENCOUNTER
Patient's previous Rheumatologist was called and a VM was left requesting last patient note, labs and Imaging.

## 2024-03-28 NOTE — TELEPHONE ENCOUNTER
Paolo from [facility] called in stating that the Rinvoq has been denied. She will also be faxing this information to the office.

## 2024-03-28 NOTE — TELEPHONE ENCOUNTER
Please reach out to the office of Dr. Davis   - Rheumatology [facility], [place]  Phone: [phone]    Her last office visit note as well as all labs and imaging that were done.      Thanks!

## 2024-03-28 NOTE — TELEPHONE ENCOUNTER
PA for Rinvoq Denied    Reason:(Screenshot if applicable)        Message sent to office clinical pool Yes    Denial letter scanned into Media Yes    Appeal started No

## 2024-03-28 NOTE — TELEPHONE ENCOUNTER
PA for Rinvoq    Submitted via    [x]CMM-KEY [id number]  []SurescriSchedulize-Case ID #    []Faxed to plan   []Other website    []Phone call Case ID #      Office notes sent, clinical questions answered. Awaiting determination    Turnaround time for your insurance to make a decision on your Prior Authorization can take 7-21 business days.

## 2024-03-29 DIAGNOSIS — G43.109 MIGRAINE WITH AURA AND WITHOUT STATUS MIGRAINOSUS, NOT INTRACTABLE: ICD-10-CM

## 2024-03-29 RX ORDER — FAMOTIDINE 20 MG/1
20 TABLET, FILM COATED ORAL DAILY
Qty: 30 TABLET | Refills: 2 | Status: SHIPPED | OUTPATIENT
Start: 2024-03-29

## 2024-03-29 RX ORDER — DULOXETIN HYDROCHLORIDE 60 MG/1
60 CAPSULE, DELAYED RELEASE ORAL DAILY
Qty: 90 CAPSULE | Refills: 2 | Status: SHIPPED | OUTPATIENT
Start: 2024-03-29

## 2024-04-01 DIAGNOSIS — L40.50 PSORIATIC ARTHRITIS (HCC): Primary | ICD-10-CM

## 2024-04-01 NOTE — TELEPHONE ENCOUNTER
PA for Taltz Approved   Date(s) approved 4/1/24-4/1/25  Case # [id number]    Patient advised by [x] [employer] Message                      [] Phone call       Pharmacy advised by [x]Fax                                     []Phone call    Approval letter scanned into Media Yes    [facility]

## 2024-04-01 NOTE — TELEPHONE ENCOUNTER
Thank you! I did not see that, but now I do.     Amada -- you can ignore the above messages - we are all set.     I will send Taltz over

## 2024-04-01 NOTE — TELEPHONE ENCOUNTER
[employer] called with approval for Taltz. Approved from 4/1/2024 - 4/2/2025. Auth number: [id number] and patient has to use [facility]

## 2024-04-01 NOTE — TELEPHONE ENCOUNTER
Patient's previous Rheumatologist office was called, her last note and labs were requested. I was told that the patient would have to contact them and sign a release of records form in order for us to receive them.

## 2024-04-01 NOTE — TELEPHONE ENCOUNTER
PA for Taltz    Submitted via    [x]CMM-KEY [id number]  [][employer]-Case ID #    []Faxed to plan   []Other website    []Phone call Case ID #      Office notes sent, clinical questions answered. Awaiting determination    Turnaround time for your insurance to make a decision on your Prior Authorization can take 7-21 business days.

## 2024-04-01 NOTE — TELEPHONE ENCOUNTER
Rheumatology Prior Authorization Request      Medication/Disease Information:   Diagnosis: Psoriatic arthritis, psoriasis  Medication:  Taltz 80mg subcutaneous injections--this will be given as a loading dose of 160 mg (two 80mg pens) one-time, followed by 80 mg every 4 weeks as maintenance dose  Failed or Intolerant to or Contraindicated:  nabumetone, Advil, Xeljanz, and Humira.  Cannot take methotrexate due to female of childbearing age  Screening  Renal function:  pending  Hepatic function: pending  Hepatitis B:  pending  Hepatitis C: pending  Tuberculosis: pending     Osiel Melara PA-C  NPI: [id number]  Lic: [id number]    Supervising Physician: Dana Silva MD  NPI: [id number]  DEBORAH: [id number]

## 2024-04-01 NOTE — TELEPHONE ENCOUNTER
Patient last OV with Rheum at [facility] was 6/22/23 Hep B test completed 2/17/23 results available via [employer].

## 2024-04-02 DIAGNOSIS — I34.0 MITRAL VALVE INSUFFICIENCY, UNSPECIFIED ETIOLOGY: Primary | ICD-10-CM

## 2024-04-03 ENCOUNTER — PATIENT MESSAGE (OUTPATIENT)
Dept: NEUROLOGY | Facility: CLINIC | Age: 34
End: 2024-04-03

## 2024-04-03 DIAGNOSIS — G43.109 MIGRAINE WITH AURA AND WITHOUT STATUS MIGRAINOSUS, NOT INTRACTABLE: ICD-10-CM

## 2024-04-04 RX ORDER — DULOXETIN HYDROCHLORIDE 60 MG/1
60 CAPSULE, DELAYED RELEASE ORAL DAILY
Qty: 90 CAPSULE | Refills: 3 | Status: SHIPPED | OUTPATIENT
Start: 2024-04-04

## 2024-04-04 NOTE — PATIENT COMMUNICATION
4/2/24, 3:05    Rafael, my name is Beatriz Otto, date of birth [birth year]. I spoke with Katherin on Friday. She called in a prescription for me and she's not on the Medicaid approved list for the pharmacy to cover my medication. So I need a different doctor to call it in, since I believe I only have today left his medication and I'm out. So if you could do that as soon as possible, would be great. Thank you very much and have a great day.     Call returned to pt. Acknowledge vm. Advised that I will ask Dr Robison to send a new script for cymbalta.    Chart reviewed   Cymbalta was sent to [facility] on 3/29/24.     Called [facility] pharmacy, spoke to Galo and states that Dr Katherin Henry is not enrolled and not medicaid approved so they cannot dispense the med. Requesting other provider send a new script.     Dr Robison, I believe you sent a script last month. Can you refill pt's cymbalta?    Rx entered. Pls review and sign off if agreeable    Thanks

## 2024-04-05 NOTE — PATIENT COMMUNICATION
Called [phone] and left a detailed message on pt's answering machine letting her know that cymbalta script was sent as requested.    Dr Henry, [facility]

## 2024-04-08 ENCOUNTER — HOSPITAL ENCOUNTER (OUTPATIENT)
Dept: MRI IMAGING | Facility: CLINIC | Age: 34
Discharge: HOME | End: 2024-04-08
Payer: COMMERCIAL

## 2024-04-08 DIAGNOSIS — M54.9 BACK PAIN: ICD-10-CM

## 2024-04-08 DIAGNOSIS — M54.16 LUMBAR RADICULOPATHY: ICD-10-CM

## 2024-04-08 PROCEDURE — A9585 GADOBUTROL INJECTION: HCPCS | Performed by: PSYCHIATRY & NEUROLOGY

## 2024-04-08 PROCEDURE — 72158 MRI LUMBAR SPINE W/O & W/DYE: CPT

## 2024-04-08 RX ORDER — GADOBUTROL 604.72 MG/ML
6 INJECTION INTRAVENOUS
Status: COMPLETED | OUTPATIENT
Start: 2024-04-08 | End: 2024-04-08

## 2024-04-08 RX ADMIN — GADOBUTROL 6 ML: 604.72 INJECTION INTRAVENOUS at 14:30

## 2024-04-12 ENCOUNTER — TELEPHONE (OUTPATIENT)
Dept: NEUROLOGY | Facility: CLINIC | Age: 34
End: 2024-04-12

## 2024-04-12 DIAGNOSIS — R20.0 NUMBNESS AND TINGLING: Primary | ICD-10-CM

## 2024-04-12 DIAGNOSIS — R20.2 NUMBNESS AND TINGLING: Primary | ICD-10-CM

## 2024-04-12 NOTE — TELEPHONE ENCOUNTER
"----- Message from Katherin Henry MD sent at 4/12/2024  1:04 PM EDT -----  Regarding: Medicaid provider?  Contact: [phone]  Oneida burdick so sorry - this is the second or 3rd time this has come up recently where I have prescribed something and it gets kicked back b/c I am not an \"approved Medicaid provider\" - I have never had this problem before and I assume I have had Medicaid patients so I'm wondering why this might be? Do I need to call the specific pharmacy etc? This patient's script should have already been taken care of but I'm hoping to avoid this problem in the future    Thank you!  N  ----- Message -----  From: Missy Sy  Sent: 4/1/2024  11:16 AM EDT  To: Yasir Robison MD; Katherin Henry MD  Subject: Cymbalta                                         Please assist.      ----- Message -----  From: Beatriz Otto  Sent: 4/1/2024  10:09 AM EDT  To: Neurology [facility]  Subject: Cymbalta                                         Good morning,  My cymbalta was called into the pharmacy by Katherin but she is not on the Medicaid approved list for prescriptions. If someone else could please call in my prescription so I can get it filled that would be great. Thank you.    "

## 2024-04-12 NOTE — TELEPHONE ENCOUNTER
Called patient to discuss MRI and answered all questions and concerns. Patient notes her pain can still sometimes occur and it sounds like a muscle spasm but patient is concerned about trying muscle relaxant. She is amenable to trying low dose gabapentin at bedtime and should have her rheumatological medicine by this weekend    Given the extent of the numbness and tingling in her legs and the relatively benign MRI findings, would suspect a neuropathy but patient is amenable to EMG for further evaluation    All questions and concerns addressed

## 2024-04-17 RX ORDER — GABAPENTIN 100 MG/1
100 CAPSULE ORAL
Qty: 60 CAPSULE | Refills: 2 | Status: SHIPPED | OUTPATIENT
Start: 2024-04-17

## 2024-04-22 ENCOUNTER — PATIENT MESSAGE (OUTPATIENT)
Dept: NEUROLOGY | Facility: CLINIC | Age: 34
End: 2024-04-22

## 2024-04-22 DIAGNOSIS — E53.8 B12 DEFICIENCY: ICD-10-CM

## 2024-04-22 DIAGNOSIS — H57.13 PAIN OF BOTH EYES: ICD-10-CM

## 2024-04-22 DIAGNOSIS — L40.50 PSORIATIC ARTHRITIS (HCC): ICD-10-CM

## 2024-04-22 DIAGNOSIS — H57.89 RED EYE: Primary | ICD-10-CM

## 2024-04-22 DIAGNOSIS — K21.9 GERD (GASTROESOPHAGEAL REFLUX DISEASE): Primary | ICD-10-CM

## 2024-04-23 ENCOUNTER — HOSPITAL ENCOUNTER (OUTPATIENT)
Dept: NON INVASIVE DIAGNOSTICS | Facility: CLINIC | Age: 34
Discharge: HOME | End: 2024-04-23
Payer: COMMERCIAL

## 2024-04-23 VITALS
SYSTOLIC BLOOD PRESSURE: 128 MMHG | BODY MASS INDEX: 26.5 KG/M2 | HEART RATE: 126 BPM | WEIGHT: 144 LBS | DIASTOLIC BLOOD PRESSURE: 80 MMHG | HEIGHT: 62 IN

## 2024-04-23 DIAGNOSIS — I34.0 MITRAL VALVE INSUFFICIENCY, UNSPECIFIED ETIOLOGY: ICD-10-CM

## 2024-04-23 LAB
AORTIC ROOT: 2.6 CM
APICAL FOUR CHAMBER EJECTION FRACTION: 67 %
ASCENDING AORTA: 2.4 CM
BSA FOR ECHO PROCEDURE: 1.66 M2
E WAVE DECELERATION TIME: 185 MS
E/A RATIO: 0.66
FRACTIONAL SHORTENING: 30 (ref 28–44)
INTERVENTRICULAR SEPTUM IN DIASTOLE (PARASTERNAL SHORT AXIS VIEW): 1 CM
INTERVENTRICULAR SEPTUM: 1 CM (ref 0.6–1.1)
LAAS-AP2: 11.4 CM2
LAAS-AP4: 11.5 CM2
LEFT ATRIUM SIZE: 2.9 CM
LEFT ATRIUM VOLUME (MOD BIPLANE): 28 ML
LEFT ATRIUM VOLUME INDEX (MOD BIPLANE): 16.9 ML/M2
LEFT INTERNAL DIMENSION IN SYSTOLE: 3 CM (ref 2.1–4)
LEFT VENTRICULAR INTERNAL DIMENSION IN DIASTOLE: 4.3 CM (ref 3.5–6)
LEFT VENTRICULAR POSTERIOR WALL IN END DIASTOLE: 1 CM
LEFT VENTRICULAR STROKE VOLUME: 50 ML
LVSV (TEICH): 50 ML
MV E'TISSUE VEL-SEP: 12 CM/S
MV PEAK A VEL: 0.77 M/S
MV PEAK E VEL: 51 CM/S
MV STENOSIS PRESSURE HALF TIME: 54 MS
MV VALVE AREA P 1/2 METHOD: 4.07
RIGHT ATRIUM AREA SYSTOLE A4C: 7.4 CM2
RIGHT VENTRICLE ID DIMENSION: 2.6 CM
SL CV LEFT ATRIUM LENGTH A2C: 3.7 CM
SL CV LV EF: 65
SL CV PED ECHO LEFT VENTRICLE DIASTOLIC VOLUME (MOD BIPLANE) 2D: 85 ML
SL CV PED ECHO LEFT VENTRICLE SYSTOLIC VOLUME (MOD BIPLANE) 2D: 35 ML
TRICUSPID ANNULAR PLANE SYSTOLIC EXCURSION: 1.9 CM

## 2024-04-23 PROCEDURE — 93306 TTE W/DOPPLER COMPLETE: CPT | Performed by: INTERNAL MEDICINE

## 2024-04-23 PROCEDURE — 93306 TTE W/DOPPLER COMPLETE: CPT

## 2024-04-26 ENCOUNTER — APPOINTMENT (OUTPATIENT)
Dept: LAB | Facility: CLINIC | Age: 34
End: 2024-04-26
Payer: COMMERCIAL

## 2024-04-26 DIAGNOSIS — M54.16 LUMBAR RADICULOPATHY: ICD-10-CM

## 2024-04-26 DIAGNOSIS — K21.9 GERD (GASTROESOPHAGEAL REFLUX DISEASE): ICD-10-CM

## 2024-04-26 DIAGNOSIS — Z00.6 ENCOUNTER FOR EXAMINATION FOR NORMAL COMPARISON OR CONTROL IN CLINICAL RESEARCH PROGRAM: ICD-10-CM

## 2024-04-26 DIAGNOSIS — E53.8 B12 DEFICIENCY: ICD-10-CM

## 2024-04-26 PROCEDURE — 80048 BASIC METABOLIC PNL TOTAL CA: CPT

## 2024-04-26 PROCEDURE — 36415 COLL VENOUS BLD VENIPUNCTURE: CPT

## 2024-04-26 PROCEDURE — 86340 INTRINSIC FACTOR ANTIBODY: CPT

## 2024-04-27 LAB
ANION GAP SERPL CALCULATED.3IONS-SCNC: 9 MMOL/L (ref 4–13)
BUN SERPL-MCNC: 9 MG/DL (ref 5–25)
CALCIUM SERPL-MCNC: 10.2 MG/DL (ref 8.4–10.2)
CHLORIDE SERPL-SCNC: 103 MMOL/L (ref 96–108)
CO2 SERPL-SCNC: 28 MMOL/L (ref 21–32)
CREAT SERPL-MCNC: 0.69 MG/DL (ref 0.6–1.3)
GFR SERPL CREATININE-BSD FRML MDRD: 116 ML/MIN/1.73SQ M
GLUCOSE P FAST SERPL-MCNC: 78 MG/DL (ref 65–99)
POTASSIUM SERPL-SCNC: 4.4 MMOL/L (ref 3.5–5.3)
SODIUM SERPL-SCNC: 140 MMOL/L (ref 135–147)

## 2024-04-28 LAB — IF BLOCK AB SER QL RIA: 1.4 AU/ML (ref 0–1.1)

## 2024-05-01 ENCOUNTER — TELEPHONE (OUTPATIENT)
Dept: RHEUMATOLOGY | Facility: CLINIC | Age: 34
End: 2024-05-01

## 2024-05-01 NOTE — TELEPHONE ENCOUNTER
Rheumatology Prior Authorization Request      Medication/Disease Information:   Diagnosis: Psoriatic arthritis, inflammatory back pain, scalp psoriasis  Medication:  Rinvoq 15mg QD  Failed or Intolerant to or Contraindicated:  nabumetone, Advil, Xeljanz, Taltz, and Humira.  Cannot take methotrexate due to female of childbearing age. Pt cannot do injectables due to anxiety.   Screening  Renal function:  ok  Hepatic function: ok  Hepatitis B:  neg  Hepatitis C: neg  Tuberculosis:  neg     Osiel Melara PA-C  NPI: [id number]  Lic: [id number]    Supervising Physician: Dana Silva MD  NPI: [id number]  DEBORAH: [id number]

## 2024-05-02 ENCOUNTER — OFFICE VISIT (OUTPATIENT)
Age: 34
End: 2024-05-02
Payer: COMMERCIAL

## 2024-05-02 VITALS
WEIGHT: 149 LBS | SYSTOLIC BLOOD PRESSURE: 126 MMHG | HEART RATE: 106 BPM | BODY MASS INDEX: 27.42 KG/M2 | TEMPERATURE: 98.2 F | OXYGEN SATURATION: 98 % | RESPIRATION RATE: 18 BRPM | HEIGHT: 62 IN | DIASTOLIC BLOOD PRESSURE: 88 MMHG

## 2024-05-02 DIAGNOSIS — F33.1 MODERATE EPISODE OF RECURRENT MAJOR DEPRESSIVE DISORDER (HCC): ICD-10-CM

## 2024-05-02 DIAGNOSIS — L40.50 PSORIATIC ARTHRITIS (HCC): ICD-10-CM

## 2024-05-02 DIAGNOSIS — R20.0 NUMBNESS AND TINGLING: ICD-10-CM

## 2024-05-02 DIAGNOSIS — Z00.00 ANNUAL PHYSICAL EXAM: Primary | ICD-10-CM

## 2024-05-02 DIAGNOSIS — R20.2 NUMBNESS AND TINGLING: ICD-10-CM

## 2024-05-02 PROCEDURE — 99213 OFFICE O/P EST LOW 20 MIN: CPT

## 2024-05-02 PROCEDURE — 99395 PREV VISIT EST AGE 18-39: CPT

## 2024-05-02 RX ORDER — BUPROPION HYDROCHLORIDE 150 MG/1
150 TABLET, EXTENDED RELEASE ORAL 2 TIMES DAILY
Qty: 180 TABLET | Refills: 1 | Status: SHIPPED | OUTPATIENT
Start: 2024-05-02 | End: 2024-10-29

## 2024-05-02 NOTE — PROGRESS NOTES
ADULT ANNUAL PHYSICAL  [facility] PRIMARY CARE [place]    NAME: Beatriz Otto  AGE: 33 y.o. SEX: female  : 1990     DATE: 2024     Assessment and Plan:     Problem List Items Addressed This Visit          Musculoskeletal and Integument    Psoriatic arthritis (HCC)     Is following with rheumatology.  They are trying different medications that are covered by insurance.  She will be starting Rinvoq soon once insurance approves it.  Continue with Relafen as well.  The [facility] neurology put her on may also help.            Behavioral Health    Moderate episode of recurrent major depressive disorder (HCC)     Will switch the patient over to Wellbutrin  mg twice daily.  Reviewed her PHQ-9 score and how it has improved.  Encouraged her to get activity as she can tolerate, but until her psoriatic arthritis is under better control it may be difficult for her to exercise.  Continue to watch her diet as she has been and continue to monitor her weight.  Patient's BMI is 27.25, reassurance given that her weight is not excessively high.  Encouraged patient's as she gets her chronic conditions under better control and her depression is better controlled.    Will have her return in 3 months to evaluate progress.  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         Relevant Medications    buPROPion (Wellbutrin SR) 150 mg 12 hr tablet       Neurology/Sleep    Numbness and tingling     Seen by neurology, feels it is radicular in nature due to her herniated disks and other degenerative changes seen on MRI.  She is also being treated for B12 deficiency.  Patient hesitant to go on muscle relaxers or gabapentin.  She says it is improving overall though          Other Visit Diagnoses       Annual physical exam    -  Primary    Establishing  with GYN next month, up-to-date on vaccines.            Immunizations and preventive care screenings were discussed with patient today. Appropriate education was printed on patient's after visit summary.    Counseling:  Alcohol/drug use: discussed moderation in alcohol intake, the recommendations for healthy alcohol use, and avoidance of illicit drug use.  Dental Health: discussed importance of regular tooth brushing, flossing, and dental visits.  Injury prevention: discussed safety/seat belts, safety helmets, smoke detectors, carbon dioxide detectors, and smoking near bedding or upholstery.  Sexual health: discussed sexually transmitted diseases, partner selection, use of condoms, avoidance of unintended pregnancy, and contraceptive alternatives.  Exercise: the importance of regular exercise/physical activity was discussed. Recommend exercise 3-5 times per week for at least 30 minutes.       Tobacco Cessation Counseling: Tobacco cessation counseling was provided. The patient is sincerely urged to quit consumption of tobacco. She is ready to quit tobacco. Encouraged to quit smoking.    Will hold on getting any lab work as patient just had a lot of lab work done recently.    Return in about 3 months (around 7/23/2024) for Recheck.     Chief Complaint:     Chief Complaint   Patient presents with    Annual Exam      History of Present Illness:     Adult Annual Physical   Patient here for a comprehensive physical exam. The patient reports problems - see below .    PT has a hx of psoriatic arthritis.  Gave her a fill of nabumetone and referred to Rheumatology.  She didn't tolerate the Taltz injection and is switched to Rinvoq.  Insurance denied Rinvoq intially and had to try the Taltz.  However, she had a reaction to the Taltz.  The humira did not last long enough between injections in the past.    Pt is on Wellbutrin XL and she was on SR in the past.  She feels her depression has worsened with depressed mood and  anhedonia and felt better on the SR when she was on it in the past.  She would like to switch to that at 150 mg BID.  Pt was started on wellbutrin at her last visit.  There were issues with insurance coverage at her previous dose, so had to start at a lower dose.  Her anxiety is much better.  Her PHQ-9 score was 16 back in January 2024, it is 8 today.    Pt is also on cymbalta started by neurology to help with her migraines, chronic pain.  She does notice a difference and has far less headaches.    Pt is also concerned about an 11 lb weight gain.  She has tried to start exercise with stretching and light exercises and has SOB and acid reflux with this.  Her pulmonologist in [place] felt it was due to her psoriatic arthritis.  She has regular periods.  The weekend has affected her mental health as well.    Referral to dermatology for rosacea and has an appt in June.  No current flare at this time.    Pt went ENT and recommended more medication for her trouble swallowing and reflux, but neurology noted low B12 and her intrinsic factor was low.  Recommended following up with GI to address this.    Cardiology referral placed for mitral valve insufficiency.  Her echo shows normal EF of 65% and only trace regurgitation of her MV.  Pt with Dr. Bautista on 06/03/2024.  Reviewed this with the patient.    She had numbness and tingling and was told it was CTS of pregnancy, but no longer pregnant and it was progressing over the past few years.  There were no red flags at the time of that visit in January 2024, but did check for anemia, B12, folate, thyroid, and metabolic disorders, which were all ruled out except for slightly low B12.  Also sent for EMG which showed no evidence of nerve conduction pathology.  Neurology also recommended supplementing her B12 and checking other inflammatory/autoimmune labs.  Her intrinsic factor was slightly high.  Also given sumatriptan and Cymbalta for migraines.  Was sent for an MRI of the c-spine,  brain.  Has a follow up appt May 30, 2024.  MRI findings showed some degenerative changes of the C and L spine.  MRA brain and MRI head were normal.  Pt is hesitant to take gabapentin, muscle relaxers, or any narcotics.    Diet and Physical Activity  Diet/Nutrition: well balanced diet.   Exercise: walking.      Depression Screening  PHQ-2/9 Depression Screening    Little interest or pleasure in doing things: 1 - several days  Feeling down, depressed, or hopeless: 1 - several days  Trouble falling or staying asleep, or sleeping too much: 1 - several days  Feeling tired or having little energy: 2 - more than half the days  Poor appetite or overeatin - several days  Feeling bad about yourself - or that you are a failure or have let yourself or your family down: 1 - several days  Trouble concentrating on things, such as reading the newspaper or watching television: 1 - several days  Moving or speaking so slowly that other people could have noticed. Or the opposite - being so fidgety or restless that you have been moving around a lot more than usual: 0 - not at all  Thoughts that you would be better off dead, or of hurting yourself in some way: 0 - not at all       General Health  Sleep: sleeps well.   Hearing: normal - bilateral.  Vision: no vision problems.   Dental: regular dental visits.       /GYN Health  Follows with gynecology? yes has appt in .  Last menstrual period: due soon, regular  Contraceptive method: IUD placement.  History of STDs?: no.     Advanced Care Planning  Do you have an advanced directive? no  Do you have a durable medical power of ? no  ACP document given to the patient? no      Review of Systems:     Review of Systems   Constitutional: Negative.    HENT: Negative.     Respiratory:  Positive for shortness of breath (With activity). Negative for cough.    Cardiovascular:  Negative for chest pain.   Gastrointestinal: Negative.    Genitourinary: Negative.    Musculoskeletal:   Positive for arthralgias (Generalized). Negative for gait problem.   Skin:  Negative for rash.   Neurological:  Positive for numbness (Improved). Negative for syncope, speech difficulty, light-headedness and headaches.   All other systems reviewed and are negative.     Past Medical History:     Past Medical History:   Diagnosis Date    Anxiety 2004    Arthritis 2022    Psoriatic arthrits    Asthma 1998    [place] asthma    Coronary artery disease 2015    2 trace leaks in valves    Depression 2004    GERD (gastroesophageal reflux disease)     Heart valve disease 2015    Hypertension     Inflammatory bowel disease 2022    Ibs-c    Irritable bowel     Kidney stone 2008,2011,2022    Memory loss     Few instances of short term memory loss the past few months    Migraine 2001    Psoriatic arthritis (HCC)     Urinary tract infection 2022    Bladder and kidney infections 3x    Visual impairment     Astigmatism, near sighted, far sighted, possible keraoconus      Past Surgical History:     History reviewed. No pertinent surgical history.   Social History:     Social History     Socioeconomic History    Marital status: /Civil Union     Spouse name: None    Number of children: None    Years of education: None    Highest education level: None   Occupational History    None   Tobacco Use    Smoking status: Every Day     Current packs/day: 0.50     Average packs/day: 0.5 packs/day for 17.3 years (8.7 ttl pk-yrs)     Types: Cigarettes     Start date: 1/1/2007    Smokeless tobacco: Never   Vaping Use    Vaping status: Never Used   Substance and Sexual Activity    Alcohol use: Never    Drug use: Never    Sexual activity: Yes     Partners: Male     Birth control/protection: I.U.D.   Other Topics Concern    None   Social History Narrative    ** Merged History Encounter **          Social Determinants of Health     Financial Resource Strain: Not on file   Food Insecurity: Not on file   Transportation Needs: Not on file    Physical Activity: Not on file   Stress: Not on file   Social Connections: Not on file   Intimate Partner Violence: Not on file   Housing Stability: Not on file      Family History:     Family History   Problem Relation Age of Onset    Asthma Mother     Heart disease Mother     Hypertension Mother     Lung disease Mother     Migraines Mother     Rashes / Skin problems Mother     Substance Abuse Mother     Mental illness Mother     Depression Mother     Coronary artery disease Mother     COPD Mother     Arthritis Mother     Autoimmune disease Mother     Vision loss Mother     Anxiety disorder Mother     Bipolar disorder Mother     Drug abuse Mother     Psychiatric Illness Mother     Cancer Father     Diabetes Father     Hypertension Father     Thyroid disease Father     Alcohol abuse Father     Substance Abuse Father     Depression Father     Arthritis Father     ADD / ADHD Father     Drug abuse Father     Asthma Maternal Grandmother     Heart disease Maternal Grandmother     Lung disease Maternal Grandmother     Osteoarthritis Maternal Grandmother     Stroke Maternal Grandmother     Coronary artery disease Maternal Grandmother     COPD Maternal Grandmother     Arthritis Maternal Grandmother     Hearing loss Maternal Grandmother     Vision loss Maternal Grandmother     Glaucoma Maternal Grandmother     Asthma Paternal Grandmother     Lung disease Paternal Grandmother     Coronary artery disease Paternal Grandmother     COPD Paternal Grandmother     Vision loss Paternal Grandmother     ADD / ADHD Brother       Current Medications:     Current Outpatient Medications   Medication Sig Dispense Refill    ascorbic acid (VITAMIN C) 250 mg tablet Take 250 mg by mouth daily      Biotin 1000 MCG CHEW Chew 3 (three) times a day      buPROPion (Wellbutrin SR) 150 mg 12 hr tablet Take 1 tablet (150 mg total) by mouth 2 (two) times a day 180 tablet 1    Cyanocobalamin (B-12) 1000 MCG SUBL Place 1 tablet (1,000 mcg total) under  "the tongue in the morning 30 tablet 3    DULoxetine (CYMBALTA) 60 mg delayed release capsule Take 1 capsule (60 mg total) by mouth daily 90 capsule 3    famotidine (PEPCID) 20 mg tablet Take 1 tablet (20 mg total) by mouth daily 30 tablet 2    Levonorgestrel (Mirena, 52 MG,) 20 MCG/DAY IUD 1 each by Intrauterine route Once every 8 years      nabumetone (RELAFEN) 500 mg tablet Take 1 tablet (500 mg total) by mouth 2 (two) times a day 180 tablet 0    omeprazole (PriLOSEC) 40 MG capsule Take 1 capsule (40 mg total) by mouth daily before breakfast 30 capsule 3    SUMAtriptan (Imitrex) 50 mg tablet Take 1 tablet (50 mg total) by mouth once as needed for migraine Can take a second dose in 2 hours if not completely effective. Do not take more than 2 doses in a day. Do not take more than twice a week 9 tablet 4     Current Facility-Administered Medications   Medication Dose Route Frequency Provider Last Rate Last Admin    cyanocobalamin injection 1,000 mcg  1,000 mcg Intramuscular Q30 Days    1,000 mcg at 02/29/24 1413      Allergies:     No Known Allergies   Physical Exam:     /88 (BP Location: Left arm, Patient Position: Sitting, Cuff Size: Standard)   Pulse (!) 106   Temp 98.2 °F (36.8 °C) (Tympanic)   Resp 18   Ht 5' 2\" (1.575 m)   Wt 67.6 kg (149 lb)   SpO2 98%   BMI 27.25 kg/m²     Physical Exam  Vitals and nursing note reviewed.   Constitutional:       General: She is not in acute distress.     Appearance: Normal appearance. She is not toxic-appearing.   HENT:      Head: Normocephalic and atraumatic.      Jaw: There is normal jaw occlusion.      Right Ear: Hearing, tympanic membrane, ear canal and external ear normal.      Left Ear: Hearing, tympanic membrane, ear canal and external ear normal.      Nose: Nose normal.      Mouth/Throat:      Lips: Pink.      Mouth: Mucous membranes are moist. No oral lesions.      Tongue: No lesions.      Palate: No mass.      Pharynx: Oropharynx is clear. Uvula midline. "   Eyes:      General: Lids are normal. Vision grossly intact.      Conjunctiva/sclera: Conjunctivae normal.      Pupils: Pupils are equal, round, and reactive to light.   Neck:      Thyroid: No thyroid mass, thyromegaly or thyroid tenderness.   Cardiovascular:      Rate and Rhythm: Normal rate and regular rhythm.      Pulses:           Radial pulses are 2+ on the right side and 2+ on the left side.        Dorsalis pedis pulses are 2+ on the right side and 2+ on the left side.      Heart sounds: Normal heart sounds. No murmur heard.  Pulmonary:      Effort: Pulmonary effort is normal. No respiratory distress.      Breath sounds: Normal breath sounds.   Abdominal:      General: Abdomen is flat. Bowel sounds are normal.      Palpations: Abdomen is soft.      Tenderness: There is no abdominal tenderness. There is no right CVA tenderness or left CVA tenderness.   Musculoskeletal:         General: No tenderness, deformity or signs of injury.      Cervical back: Full passive range of motion without pain.      Right lower leg: No edema.      Left lower leg: No edema.      Comments: Patient has generalized joint aches and pains.  No swelling or erythema of any joints.   Lymphadenopathy:      Cervical: No cervical adenopathy.   Skin:     General: Skin is warm and dry.      Capillary Refill: Capillary refill takes less than 2 seconds.      Coloration: Skin is not jaundiced.      Findings: No ecchymosis or rash.   Neurological:      General: No focal deficit present.      Mental Status: She is alert and oriented to person, place, and time.      GCS: GCS eye subscore is 4. GCS verbal subscore is 5. GCS motor subscore is 6.      Gait: Gait is intact.   Psychiatric:         Mood and Affect: Mood normal.         Speech: Speech normal.         Behavior: Behavior normal. Behavior is cooperative.         Thought Content: Thought content normal.          Maria E Carpenter PA-C   [facility] PRIMARY CARE [place]

## 2024-05-02 NOTE — ASSESSMENT & PLAN NOTE
Will switch the patient over to Wellbutrin  mg twice daily.  Reviewed her PHQ-9 score and how it has improved.  Encouraged her to get activity as she can tolerate, but until her psoriatic arthritis is under better control it may be difficult for her to exercise.  Continue to watch her diet as she has been and continue to monitor her weight.  Patient's BMI is 27.25, reassurance given that her weight is not excessively high.  Encouraged patient's as she gets her chronic conditions under better control and her depression is better controlled.    Will have her return in 3 months to evaluate progress.  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

## 2024-05-02 NOTE — ASSESSMENT & PLAN NOTE
Is following with rheumatology.  They are trying different medications that are covered by insurance.  She will be starting Rinvoq soon once insurance approves it.  Continue with Relafen as well.  The [facility] neurology put her on may also help.

## 2024-05-02 NOTE — ASSESSMENT & PLAN NOTE
Seen by neurology, feels it is radicular in nature due to her herniated disks and other degenerative changes seen on MRI.  She is also being treated for B12 deficiency.  Patient hesitant to go on muscle relaxers or gabapentin.  She says it is improving overall though

## 2024-05-03 DIAGNOSIS — E53.8 B12 DEFICIENCY: ICD-10-CM

## 2024-05-03 RX ORDER — MAGNESIUM 200 MG
1000 TABLET ORAL DAILY
Qty: 90 TABLET | Refills: 2 | Status: SHIPPED | OUTPATIENT
Start: 2024-05-03

## 2024-05-07 NOTE — TELEPHONE ENCOUNTER
[facility] calling stating prior auth for Rinvoq was denied . [id number]. [phone]

## 2024-05-07 NOTE — TELEPHONE ENCOUNTER
PA for Rinvoq 15mg    Submitted via    []CMM-KEY   []Surescripts-Case ID #   []Faxed to plan   [x]Other website [employer]- Prior Auth (EOC) ID:  [id number]  []Phone call Case ID #     Office notes sent, clinical questions answered. Awaiting determination    Turnaround time for your insurance to make a decision on your Prior Authorization can take 7-21 business days.

## 2024-05-08 ENCOUNTER — OFFICE VISIT (OUTPATIENT)
Dept: GASTROENTEROLOGY | Facility: CLINIC | Age: 34
End: 2024-05-08
Payer: COMMERCIAL

## 2024-05-08 VITALS
BODY MASS INDEX: 27.23 KG/M2 | SYSTOLIC BLOOD PRESSURE: 110 MMHG | DIASTOLIC BLOOD PRESSURE: 68 MMHG | WEIGHT: 148 LBS | OXYGEN SATURATION: 98 % | HEIGHT: 62 IN | HEART RATE: 118 BPM

## 2024-05-08 DIAGNOSIS — K21.9 GERD (GASTROESOPHAGEAL REFLUX DISEASE): ICD-10-CM

## 2024-05-08 DIAGNOSIS — K58.9 IRRITABLE BOWEL SYNDROME, UNSPECIFIED TYPE: Primary | ICD-10-CM

## 2024-05-08 PROCEDURE — 99203 OFFICE O/P NEW LOW 30 MIN: CPT | Performed by: PHYSICIAN ASSISTANT

## 2024-05-08 NOTE — PROGRESS NOTES
Answers submitted by the patient for this visit:  Abdominal Pain Questionnaire (Submitted on 5/6/2024)  Chief Complaint: Abdominal pain  Chronicity: chronic  Onset: more than 1 year ago  Onset quality: undetermined  Frequency: daily  Progression since onset: waxing and waning  Pain location: generalized abdominal region  Pain - numeric: 8/10  Pain quality: burning, cramping  Radiates to: chest  anorexia: No  arthralgias: Yes  belching: No  constipation: Yes  diarrhea: Yes  dysuria: No  fever: No  flatus: No  frequency: No  headaches: Yes  hematochezia: No  hematuria: No  melena: No  myalgias: Yes  nausea: Yes  weight loss: No  vomiting: No  Aggravated by: being still, belching, certain positions, movement  [facility]  Beatriz Otto 33 y.o. female MRN: [id number]  Encounter: [id number]          ASSESSMENT AND PLAN:      1. GERD (gastroesophageal reflux disease)      2. Irritable bowel syndrome, unspecified type  We will plan EGD and colonoscopy with biopsy at this time.    No medications will be started until GI workup is complete.  Patient has failed famotidine therapy.      Patient will follow-up after endoscopic evaluation is complete.  ______________________________________________________________________    SUBJECTIVE:    33-year-old female with a past medical history significant for GERD and irritable bowel syndrome as well as psoriatic arthritis who presents to the GI clinic today for consultation.  Patient reports that for many years she has been suffering with IBS symptoms as well as acid reflux.  She reports that she did see a gastroenterologist in [place] many years ago.  She has never had a formal GI workup completed.  She right now is reporting severe acid reflux as well as associated shortness of breath.  She reports that her reflux is worse with bending and certain movements.  She reports that she has drastically modified  her diet without any benefit in symptoms.  She is reporting dysphagia.  She reports alternating bowel habits.  She denies any alarm symptoms to include melena or rectal bleeding.    REVIEW OF SYSTEMS IS OTHERWISE NEGATIVE.      Historical Information   Past Medical History:   Diagnosis Date    Anxiety 2004    Arthritis 2022    Psoriatic arthrits    Asthma 1998    Juvenille asthma    Coronary artery disease 2015    2 trace leaks in valves    Depression 2004    GERD (gastroesophageal reflux disease)     Heart valve disease 2015    Hypertension     Inflammatory bowel disease 2022    Ibs-c    Irritable bowel     Kidney stone 2008,2011,2022    Memory loss     Few instances of short term memory loss the past few months    Migraine 2001    Psoriatic arthritis (HCC)     Urinary tract infection 2022    Bladder and kidney infections 3x    Visual impairment     Astigmatism, near sighted, far sighted, possible keraoconus     No past surgical history on file.  Social History   Social History     Substance and Sexual Activity   Alcohol Use Never     Social History     Substance and Sexual Activity   Drug Use Never     Social History     Tobacco Use   Smoking Status Every Day    Current packs/day: 0.50    Average packs/day: 0.5 packs/day for 17.3 years (8.7 ttl pk-yrs)    Types: Cigarettes    Start date: 1/1/2007   Smokeless Tobacco Never     Family History   Problem Relation Age of Onset    Asthma Mother     Heart disease Mother     Hypertension Mother     Lung disease Mother     Migraines Mother     Rashes / Skin problems Mother     Substance Abuse Mother     Mental illness Mother     Depression Mother     Coronary artery disease Mother     COPD Mother     Arthritis Mother     Autoimmune disease Mother     Vision loss Mother     Anxiety disorder Mother     Bipolar disorder Mother     Drug abuse Mother     Psychiatric Illness Mother     Cancer Father     Diabetes Father     Hypertension Father     Thyroid disease Father      "Alcohol abuse Father     Substance Abuse Father     Depression Father     Arthritis Father     ADD / ADHD Father     Drug abuse Father     Asthma Maternal Grandmother     Heart disease Maternal Grandmother     Lung disease Maternal Grandmother     Osteoarthritis Maternal Grandmother     Stroke Maternal Grandmother     Coronary artery disease Maternal Grandmother     COPD Maternal Grandmother     Arthritis Maternal Grandmother     Hearing loss Maternal Grandmother     Vision loss Maternal Grandmother     Glaucoma Maternal Grandmother     Asthma Paternal Grandmother     Lung disease Paternal Grandmother     Coronary artery disease Paternal Grandmother     COPD Paternal Grandmother     Vision loss Paternal Grandmother     ADD / ADHD Brother        Meds/Allergies       Current Outpatient Medications:     ascorbic acid (VITAMIN C) 250 mg tablet    Biotin 1000 MCG CHEW    buPROPion (Wellbutrin SR) 150 mg 12 hr tablet    Cyanocobalamin (B-12) 1000 MCG SUBL    DULoxetine (CYMBALTA) 60 mg delayed release capsule    famotidine (PEPCID) 20 mg tablet    Levonorgestrel (Mirena, 52 MG,) 20 MCG/DAY IUD    nabumetone (RELAFEN) 500 mg tablet    omeprazole (PriLOSEC) 40 MG capsule    SUMAtriptan (Imitrex) 50 mg tablet    Current Facility-Administered Medications:     cyanocobalamin injection 1,000 mcg, 1,000 mcg, Intramuscular, Q30 Days, 1,000 mcg at 02/29/24 1413    No Known Allergies        Objective     Blood pressure 110/68, pulse (!) 118, height 5' 2\" (1.575 m), weight 67.1 kg (148 lb), SpO2 98%. Body mass index is 27.07 kg/m².      PHYSICAL EXAM:      General Appearance:   Alert, cooperative, no distress   HEENT:   Normocephalic, atraumatic, anicteric.     Neck:  Supple, symmetrical, trachea midline   Lungs:   Clear to auscultation bilaterally; no rales, rhonchi or wheezing; respirations unlabored    Heart::   Regular rate and rhythm; no murmur, rub, or gallop.   Abdomen:   Soft, non-tender, non-distended; normal bowel sounds; " no masses, no organomegaly    Genitalia:   Deferred    Rectal:   Deferred    Extremities:  No cyanosis, clubbing or edema    Pulses:  2+ and symmetric    Skin:  No jaundice, rashes, or lesions    Lymph nodes:  No palpable cervical lymphadenopathy        Lab Results:   No visits with results within 1 Day(s) from this visit.   Latest known visit with results is:   Appointment on 04/26/2024   Component Date Value    Sodium 04/26/2024 140     Potassium 04/26/2024 4.4     Chloride 04/26/2024 103     CO2 04/26/2024 28     ANION GAP 04/26/2024 9     BUN 04/26/2024 9     Creatinine 04/26/2024 0.69     Glucose, Fasting 04/26/2024 78     Calcium 04/26/2024 10.2     eGFR 04/26/2024 116     Intrinsic Factor 04/26/2024 1.4 (H)          Radiology Results:   Echo complete w/ contrast if indicated    Result Date: 4/23/2024  Narrative:   Left Ventricle: Left ventricular cavity size is normal. Wall thickness is normal. The left ventricular ejection fraction is 65%. Systolic function is normal. Wall motion is normal. Diastolic function is normal.   Mitral Valve: There is trace regurgitation.   Tricuspid Valve: There is trace regurgitation.   Normal echocardiogram.

## 2024-05-08 NOTE — TELEPHONE ENCOUNTER
PA for Rinvoq Denied    Reason:(Screenshot if applicable)        Message sent to office clinical pool Yes    Denial letter scanned into Media Yes    Appeal started No ( Provider will need to decide if appeal is warranted and send clinical documentation to PA team for initiation.)

## 2024-05-08 NOTE — TELEPHONE ENCOUNTER
Pls note she is not on a CAM antagonist.  She tried and failed 2 CAM antagonists - humira and xeljanz.    Please appeal

## 2024-05-08 NOTE — H&P (VIEW-ONLY)
Answers submitted by the patient for this visit:  Abdominal Pain Questionnaire (Submitted on 5/6/2024)  Chief Complaint: Abdominal pain  Chronicity: chronic  Onset: more than 1 year ago  Onset quality: undetermined  Frequency: daily  Progression since onset: waxing and waning  Pain location: generalized abdominal region  Pain - numeric: 8/10  Pain quality: burning, cramping  Radiates to: chest  anorexia: No  arthralgias: Yes  belching: No  constipation: Yes  diarrhea: Yes  dysuria: No  fever: No  flatus: No  frequency: No  headaches: Yes  hematochezia: No  hematuria: No  melena: No  myalgias: Yes  nausea: Yes  weight loss: No  vomiting: No  Aggravated by: being still, belching, certain positions, movement  Relieved by[facility]  Beatriz Otto 33 y.o. female MRN: [id number]  Encounter: [id number]          ASSESSMENT AND PLAN:      1. GERD (gastroesophageal reflux disease)      2. Irritable bowel syndrome, unspecified type  We will plan EGD and colonoscopy with biopsy at this time.    No medications will be started until GI workup is complete.  Patient has failed famotidine therapy.      Patient will follow-up after endoscopic evaluation is complete.  ______________________________________________________________________    SUBJECTIVE:    33-year-old female with a past medical history significant for GERD and irritable bowel syndrome as well as psoriatic arthritis who presents to the GI clinic today for consultation.  Patient reports that for many years she has been suffering with IBS symptoms as well as acid reflux.  She reports that she did see a gastroenterologist in [place] many years ago.  She has never had a formal GI workup completed.  She right now is reporting severe acid reflux as well as associated shortness of breath.  She reports that her reflux is worse with bending and certain movements.  She reports that she has drastically modified  her diet without any benefit in symptoms.  She is reporting dysphagia.  She reports alternating bowel habits.  She denies any alarm symptoms to include melena or rectal bleeding.    REVIEW OF SYSTEMS IS OTHERWISE NEGATIVE.      Historical Information   Past Medical History:   Diagnosis Date    Anxiety 2004    Arthritis 2022    Psoriatic arthrits    Asthma 1998    Juvenille asthma    Coronary artery disease 2015    2 trace leaks in valves    Depression 2004    GERD (gastroesophageal reflux disease)     Heart valve disease 2015    Hypertension     Inflammatory bowel disease 2022    Ibs-c    Irritable bowel     Kidney stone 2008,2011,2022    Memory loss     Few instances of short term memory loss the past few months    Migraine 2001    Psoriatic arthritis (HCC)     Urinary tract infection 2022    Bladder and kidney infections 3x    Visual impairment     Astigmatism, near sighted, far sighted, possible keraoconus     No past surgical history on file.  Social History   Social History     Substance and Sexual Activity   Alcohol Use Never     Social History     Substance and Sexual Activity   Drug Use Never     Social History     Tobacco Use   Smoking Status Every Day    Current packs/day: 0.50    Average packs/day: 0.5 packs/day for 17.3 years (8.7 ttl pk-yrs)    Types: Cigarettes    Start date: 1/1/2007   Smokeless Tobacco Never     Family History   Problem Relation Age of Onset    Asthma Mother     Heart disease Mother     Hypertension Mother     Lung disease Mother     Migraines Mother     Rashes / Skin problems Mother     Substance Abuse Mother     Mental illness Mother     Depression Mother     Coronary artery disease Mother     COPD Mother     Arthritis Mother     Autoimmune disease Mother     Vision loss Mother     Anxiety disorder Mother     Bipolar disorder Mother     Drug abuse Mother     Psychiatric Illness Mother     Cancer Father     Diabetes Father     Hypertension Father     Thyroid disease Father      "Alcohol abuse Father     Substance Abuse Father     Depression Father     Arthritis Father     ADD / ADHD Father     Drug abuse Father     Asthma Maternal Grandmother     Heart disease Maternal Grandmother     Lung disease Maternal Grandmother     Osteoarthritis Maternal Grandmother     Stroke Maternal Grandmother     Coronary artery disease Maternal Grandmother     COPD Maternal Grandmother     Arthritis Maternal Grandmother     Hearing loss Maternal Grandmother     Vision loss Maternal Grandmother     Glaucoma Maternal Grandmother     Asthma Paternal Grandmother     Lung disease Paternal Grandmother     Coronary artery disease Paternal Grandmother     COPD Paternal Grandmother     Vision loss Paternal Grandmother     ADD / ADHD Brother        Meds/Allergies       Current Outpatient Medications:     ascorbic acid (VITAMIN C) 250 mg tablet    Biotin 1000 MCG CHEW    buPROPion (Wellbutrin SR) 150 mg 12 hr tablet    Cyanocobalamin (B-12) 1000 MCG SUBL    DULoxetine (CYMBALTA) 60 mg delayed release capsule    famotidine (PEPCID) 20 mg tablet    Levonorgestrel (Mirena, 52 MG,) 20 MCG/DAY IUD    nabumetone (RELAFEN) 500 mg tablet    omeprazole (PriLOSEC) 40 MG capsule    SUMAtriptan (Imitrex) 50 mg tablet    Current Facility-Administered Medications:     cyanocobalamin injection 1,000 mcg, 1,000 mcg, Intramuscular, Q30 Days, 1,000 mcg at 02/29/24 1413    No Known Allergies        Objective     Blood pressure 110/68, pulse (!) 118, height 5' 2\" (1.575 m), weight 67.1 kg (148 lb), SpO2 98%. Body mass index is 27.07 kg/m².      PHYSICAL EXAM:      General Appearance:   Alert, cooperative, no distress   HEENT:   Normocephalic, atraumatic, anicteric.     Neck:  Supple, symmetrical, trachea midline   Lungs:   Clear to auscultation bilaterally; no rales, rhonchi or wheezing; respirations unlabored    Heart::   Regular rate and rhythm; no murmur, rub, or gallop.   Abdomen:   Soft, non-tender, non-distended; normal bowel sounds; " no masses, no organomegaly    Genitalia:   Deferred    Rectal:   Deferred    Extremities:  No cyanosis, clubbing or edema    Pulses:  2+ and symmetric    Skin:  No jaundice, rashes, or lesions    Lymph nodes:  No palpable cervical lymphadenopathy        Lab Results:   No visits with results within 1 Day(s) from this visit.   Latest known visit with results is:   Appointment on 04/26/2024   Component Date Value    Sodium 04/26/2024 140     Potassium 04/26/2024 4.4     Chloride 04/26/2024 103     CO2 04/26/2024 28     ANION GAP 04/26/2024 9     BUN 04/26/2024 9     Creatinine 04/26/2024 0.69     Glucose, Fasting 04/26/2024 78     Calcium 04/26/2024 10.2     eGFR 04/26/2024 116     Intrinsic Factor 04/26/2024 1.4 (H)          Radiology Results:   Echo complete w/ contrast if indicated    Result Date: 4/23/2024  Narrative:   Left Ventricle: Left ventricular cavity size is normal. Wall thickness is normal. The left ventricular ejection fraction is 65%. Systolic function is normal. Wall motion is normal. Diastolic function is normal.   Mitral Valve: There is trace regurgitation.   Tricuspid Valve: There is trace regurgitation.   Normal echocardiogram.

## 2024-05-08 NOTE — PATIENT INSTRUCTIONS
Scheduled date of EGD/colonoscopy (as of today):5/31/24  Physician performing EGD/colonoscopy:Garrison  Location of EGD/colonoscopy:[facility]  Desired bowel prep reviewed with patient:Sue/Miralax  Instructions reviewed with patient by:rPateek berger  Clearances:   none

## 2024-05-09 NOTE — TELEPHONE ENCOUNTER
PA for Rinvoq appealed via     []CMM  []SS  [x]Letter sent to insurance via fax [phone]  []Other site or means     All necessary records sent. Will await response from insurance company    Turnaround time for a decision to be made on an appeal could take up to 30 business days

## 2024-05-17 LAB
APOB+LDLR+PCSK9 GENE MUT ANL BLD/T: NOT DETECTED
BRCA1+BRCA2 DEL+DUP + FULL MUT ANL BLD/T: NOT DETECTED
MLH1+MSH2+MSH6+PMS2 GN DEL+DUP+FUL M: NOT DETECTED

## 2024-05-20 ENCOUNTER — PATIENT MESSAGE (OUTPATIENT)
Dept: RHEUMATOLOGY | Facility: CLINIC | Age: 34
End: 2024-05-20

## 2024-05-22 DIAGNOSIS — L40.50 PSORIATIC ARTHRITIS (HCC): Primary | ICD-10-CM

## 2024-05-22 RX ORDER — UPADACITINIB 15 MG/1
15 TABLET, EXTENDED RELEASE ORAL DAILY
Qty: 30 TABLET | Refills: 5 | Status: SHIPPED | OUTPATIENT
Start: 2024-05-22 | End: 2024-06-21

## 2024-05-28 ENCOUNTER — TELEPHONE (OUTPATIENT)
Dept: NEUROLOGY | Facility: CLINIC | Age: 34
End: 2024-05-28

## 2024-05-28 ENCOUNTER — OFFICE VISIT (OUTPATIENT)
Dept: URGENT CARE | Facility: CLINIC | Age: 34
End: 2024-05-28
Payer: COMMERCIAL

## 2024-05-28 VITALS
HEART RATE: 119 BPM | RESPIRATION RATE: 20 BRPM | OXYGEN SATURATION: 98 % | TEMPERATURE: 98 F | DIASTOLIC BLOOD PRESSURE: 68 MMHG | SYSTOLIC BLOOD PRESSURE: 110 MMHG | BODY MASS INDEX: 27.62 KG/M2 | WEIGHT: 151 LBS

## 2024-05-28 DIAGNOSIS — J34.89 INFECTION OF NOSE: Primary | ICD-10-CM

## 2024-05-28 PROCEDURE — 99213 OFFICE O/P EST LOW 20 MIN: CPT | Performed by: NURSE PRACTITIONER

## 2024-05-28 PROCEDURE — S9088 SERVICES PROVIDED IN URGENT: HCPCS | Performed by: NURSE PRACTITIONER

## 2024-05-28 RX ORDER — CEPHALEXIN 500 MG/1
500 CAPSULE ORAL 3 TIMES DAILY
Qty: 15 CAPSULE | Refills: 0 | Status: SHIPPED | OUTPATIENT
Start: 2024-05-28 | End: 2024-06-02

## 2024-05-28 NOTE — TELEPHONE ENCOUNTER
Left voicemail for patient to call back or on my chart confirm appointment with Dr. Henry 5/30 1:30 pm at [facility].

## 2024-05-28 NOTE — PROGRESS NOTES
[facility]        NAME: Beatriz Otto is a 33 y.o. female  : 1990    MRN: [id number]  DATE: May 28, 2024  TIME: 1:53 PM    Assessment and Plan   Infection of nose [J34.89]  1. Infection of nose  cephalexin (KEFLEX) 500 mg capsule            Patient Instructions     Take med as prescribed   Follow up with PCP in 3-5 days.  Proceed to  ER if symptoms worsen or go to ENT.    If tests have been performed at [facility], our office will contact you with results if changes need to be made to the care plan discussed with you at the visit.  You can review your full results on [facility].    Chief Complaint     Chief Complaint   Patient presents with    Nose Irritation     In each nostril there is an irritation and white dot areas. Left nostril has a nose ring and patient stated there is a bump there. Noticed it 2 days ago         History of Present Illness       HPI  Reports she has swelling around the ring of on the left nostril. This ing has been on her nose x 20 years. Also reports some white dots on the entrance of the bilateral nostril x 2 days. No nose bleed. No trauma.     Review of Systems   Review of Systems   Constitutional:  Negative for fever.   HENT:  Negative for nosebleeds, postnasal drip and sinus pressure.    Skin:  Negative for color change and wound.         Current Medications       Current Outpatient Medications:     ascorbic acid (VITAMIN C) 250 mg tablet, Take 250 mg by mouth daily, Disp: , Rfl:     Biotin 1000 MCG CHEW, Chew 3 (three) times a day, Disp: , Rfl:     buPROPion (Wellbutrin SR) 150 mg 12 hr tablet, Take 1 tablet (150 mg total) by mouth 2 (two) times a day, Disp: 180 tablet, Rfl: 1    cephalexin (KEFLEX) 500 mg capsule, Take 1 capsule (500 mg total) by mouth 3 (three) times a day for 5 days, Disp: 15 capsule, Rfl: 0    DULoxetine (CYMBALTA) 60 mg delayed release capsule, Take 1 capsule (60 mg total) by mouth daily, Disp: 90 capsule, Rfl: 3    Levonorgestrel  (Mirena, 52 MG,) 20 MCG/DAY IUD, 1 each by Intrauterine route Once every 8 years, Disp: , Rfl:     SUMAtriptan (Imitrex) 50 mg tablet, Take 1 tablet (50 mg total) by mouth once as needed for migraine Can take a second dose in 2 hours if not completely effective. Do not take more than 2 doses in a day. Do not take more than twice a week, Disp: 9 tablet, Rfl: 4    Cyanocobalamin (B-12) 1000 MCG SUBL, PLACE 1 TABLET (1,000 MCG TOTAL) UNDER THE TONGUE IN THE MORNING (Patient not taking: Reported on 5/28/2024), Disp: 90 tablet, Rfl: 2    famotidine (PEPCID) 20 mg tablet, Take 1 tablet (20 mg total) by mouth daily (Patient not taking: Reported on 5/28/2024), Disp: 30 tablet, Rfl: 2    nabumetone (RELAFEN) 500 mg tablet, Take 1 tablet (500 mg total) by mouth 2 (two) times a day, Disp: 180 tablet, Rfl: 0    omeprazole (PriLOSEC) 40 MG capsule, Take 1 capsule (40 mg total) by mouth daily before breakfast (Patient not taking: Reported on 5/28/2024), Disp: 30 capsule, Rfl: 3    Upadacitinib ER (Rinvoq) 15 MG TB24, Take 15 mg by mouth in the morning (Patient not taking: Reported on 5/28/2024), Disp: 30 tablet, Rfl: 5    Current Facility-Administered Medications:     cyanocobalamin injection 1,000 mcg, 1,000 mcg, Intramuscular, Q30 Days, , 1,000 mcg at 02/29/24 1413    Current Allergies     Allergies as of 05/28/2024    (No Known Allergies)            The following portions of the patient's history were reviewed and updated as appropriate: allergies, current medications, past family history, past medical history, past social history, past surgical history and problem list.     Past Medical History:   Diagnosis Date    Anxiety 2004    Arthritis 2022    Psoriatic arthrits    Asthma 1998    Juvenille asthma    Coronary artery disease 2015    2 trace leaks in valves    Depression 2004    GERD (gastroesophageal reflux disease)     Heart valve disease 2015    Hypertension     Inflammatory bowel disease 2022    Ibs-c    Irritable bowel      Kidney stone 2008,2011,2022    Memory loss     Few instances of short term memory loss the past few months    Migraine 2001    Psoriatic arthritis (HCC)     Urinary tract infection 2022    Bladder and kidney infections 3x    Visual impairment     Astigmatism, near sighted, far sighted, possible keraoconus       History reviewed. No pertinent surgical history.    Family History   Problem Relation Age of Onset    Asthma Mother     Heart disease Mother     Hypertension Mother     Lung disease Mother     Migraines Mother     Rashes / Skin problems Mother     Substance Abuse Mother     Mental illness Mother     Depression Mother     Coronary artery disease Mother     COPD Mother     Arthritis Mother     Autoimmune disease Mother     Vision loss Mother     Anxiety disorder Mother     Bipolar disorder Mother     Drug abuse Mother     Psychiatric Illness Mother     Cancer Father     Diabetes Father     Hypertension Father     Thyroid disease Father     Alcohol abuse Father     Substance Abuse Father     Depression Father     Arthritis Father     ADD / ADHD Father     Drug abuse Father     Asthma Maternal Grandmother     Heart disease Maternal Grandmother     Lung disease Maternal Grandmother     Osteoarthritis Maternal Grandmother     Stroke Maternal Grandmother     Coronary artery disease Maternal Grandmother     COPD Maternal Grandmother     Arthritis Maternal Grandmother     Hearing loss Maternal Grandmother     Vision loss Maternal Grandmother     Glaucoma Maternal Grandmother     Asthma Paternal Grandmother     Lung disease Paternal Grandmother     Coronary artery disease Paternal Grandmother     COPD Paternal Grandmother     Vision loss Paternal Grandmother     ADD / ADHD Brother          Medications have been verified.        Objective   /68   Pulse (!) 119   Temp 98 °F (36.7 °C)   Resp 20   Wt 68.5 kg (151 lb)   SpO2 98%   BMI 27.62 kg/m²   No LMP recorded. (Menstrual status: Birth Control).        Physical Exam     Physical Exam  HENT:      Nose: No congestion or rhinorrhea.      Comments: Turbinates 1+. Mild swelling with mild rash-like lesion on the entrance of the nostrils. Also mild swelling and redness on the inside of the nostril, where the ring pierces the skin. Minimal erythema.

## 2024-05-31 ENCOUNTER — ANESTHESIA EVENT (OUTPATIENT)
Dept: GASTROENTEROLOGY | Facility: HOSPITAL | Age: 34
End: 2024-05-31

## 2024-05-31 ENCOUNTER — ANESTHESIA (OUTPATIENT)
Dept: GASTROENTEROLOGY | Facility: HOSPITAL | Age: 34
End: 2024-05-31

## 2024-05-31 ENCOUNTER — HOSPITAL ENCOUNTER (OUTPATIENT)
Dept: GASTROENTEROLOGY | Facility: HOSPITAL | Age: 34
Setting detail: OUTPATIENT SURGERY
End: 2024-05-31
Payer: COMMERCIAL

## 2024-05-31 VITALS
DIASTOLIC BLOOD PRESSURE: 85 MMHG | HEIGHT: 62 IN | WEIGHT: 151.01 LBS | TEMPERATURE: 97.3 F | SYSTOLIC BLOOD PRESSURE: 130 MMHG | OXYGEN SATURATION: 100 % | HEART RATE: 89 BPM | BODY MASS INDEX: 27.79 KG/M2 | RESPIRATION RATE: 20 BRPM

## 2024-05-31 DIAGNOSIS — K58.9 IRRITABLE BOWEL SYNDROME, UNSPECIFIED TYPE: ICD-10-CM

## 2024-05-31 DIAGNOSIS — K21.9 GERD (GASTROESOPHAGEAL REFLUX DISEASE): ICD-10-CM

## 2024-05-31 PROBLEM — F17.200 SMOKING: Status: ACTIVE | Noted: 2024-05-31

## 2024-05-31 LAB
EXT PREGNANCY TEST URINE: NEGATIVE
EXT. CONTROL: NORMAL

## 2024-05-31 PROCEDURE — 88305 TISSUE EXAM BY PATHOLOGIST: CPT | Performed by: PATHOLOGY

## 2024-05-31 PROCEDURE — 43239 EGD BIOPSY SINGLE/MULTIPLE: CPT | Performed by: INTERNAL MEDICINE

## 2024-05-31 PROCEDURE — 81025 URINE PREGNANCY TEST: CPT | Performed by: INTERNAL MEDICINE

## 2024-05-31 PROCEDURE — 43248 EGD GUIDE WIRE INSERTION: CPT | Performed by: INTERNAL MEDICINE

## 2024-05-31 RX ORDER — LIDOCAINE HYDROCHLORIDE 20 MG/ML
INJECTION, SOLUTION EPIDURAL; INFILTRATION; INTRACAUDAL; PERINEURAL AS NEEDED
Status: DISCONTINUED | OUTPATIENT
Start: 2024-05-31 | End: 2024-05-31

## 2024-05-31 RX ORDER — PROPOFOL 10 MG/ML
INJECTION, EMULSION INTRAVENOUS AS NEEDED
Status: DISCONTINUED | OUTPATIENT
Start: 2024-05-31 | End: 2024-05-31

## 2024-05-31 RX ORDER — SODIUM CHLORIDE, SODIUM LACTATE, POTASSIUM CHLORIDE, CALCIUM CHLORIDE 600; 310; 30; 20 MG/100ML; MG/100ML; MG/100ML; MG/100ML
INJECTION, SOLUTION INTRAVENOUS CONTINUOUS PRN
Status: DISCONTINUED | OUTPATIENT
Start: 2024-05-31 | End: 2024-05-31

## 2024-05-31 RX ORDER — SODIUM CHLORIDE, SODIUM LACTATE, POTASSIUM CHLORIDE, CALCIUM CHLORIDE 600; 310; 30; 20 MG/100ML; MG/100ML; MG/100ML; MG/100ML
125 INJECTION, SOLUTION INTRAVENOUS CONTINUOUS
Status: DISCONTINUED | OUTPATIENT
Start: 2024-05-31 | End: 2024-06-04 | Stop reason: HOSPADM

## 2024-05-31 RX ADMIN — PROPOFOL 35 MG: 10 INJECTION, EMULSION INTRAVENOUS at 13:20

## 2024-05-31 RX ADMIN — PROPOFOL 35 MG: 10 INJECTION, EMULSION INTRAVENOUS at 13:14

## 2024-05-31 RX ADMIN — PROPOFOL 35 MG: 10 INJECTION, EMULSION INTRAVENOUS at 13:17

## 2024-05-31 RX ADMIN — PROPOFOL 35 MG: 10 INJECTION, EMULSION INTRAVENOUS at 13:02

## 2024-05-31 RX ADMIN — PROPOFOL 35 MG: 10 INJECTION, EMULSION INTRAVENOUS at 13:11

## 2024-05-31 RX ADMIN — PROPOFOL 35 MG: 10 INJECTION, EMULSION INTRAVENOUS at 13:05

## 2024-05-31 RX ADMIN — PROPOFOL 100 MG: 10 INJECTION, EMULSION INTRAVENOUS at 12:59

## 2024-05-31 RX ADMIN — PROPOFOL 35 MG: 10 INJECTION, EMULSION INTRAVENOUS at 13:08

## 2024-05-31 RX ADMIN — PROPOFOL 75 MG: 10 INJECTION, EMULSION INTRAVENOUS at 13:00

## 2024-05-31 RX ADMIN — LIDOCAINE HYDROCHLORIDE 100 MG: 20 INJECTION, SOLUTION EPIDURAL; INFILTRATION; INTRACAUDAL at 12:58

## 2024-05-31 RX ADMIN — SODIUM CHLORIDE, SODIUM LACTATE, POTASSIUM CHLORIDE, AND CALCIUM CHLORIDE: .6; .31; .03; .02 INJECTION, SOLUTION INTRAVENOUS at 10:54

## 2024-05-31 NOTE — ANESTHESIA PREPROCEDURE EVALUATION
Procedure:  EGD  COLONOSCOPY    Relevant Problems   CARDIO   (+) Migraine      GI/HEPATIC   (+) GERD (gastroesophageal reflux disease)      MUSCULOSKELETAL   (+) Psoriatic arthritis (HCC)      NEURO/PSYCH   (+) Generalized anxiety disorder   (+) Migraine   (+) Moderate episode of recurrent major depressive disorder (HCC)      PULMONARY   (+) Smoking       Left Ventricle: Left ventricular cavity size is normal. Wall thickness is normal. The left ventricular ejection fraction is 65%. Systolic function is normal. Wall motion is normal. Diastolic function is normal.    Mitral Valve: There is trace regurgitation.    Tricuspid Valve: There is trace regurgitation.    Normal echocardiogram.     Physical Exam    Airway    Mallampati score: II  TM Distance: >3 FB  Neck ROM: full     Dental   Comment: Denies loose teeth     Cardiovascular  Cardiovascular exam normal    Pulmonary  Pulmonary exam normal     Other Findings  Portions of exam deferred due to low yield and/or unknown COVID statuspost-pubertal.      Anesthesia Plan  ASA Score- 2     Anesthesia Type- IV sedation with anesthesia with ASA Monitors.         Additional Monitors:     Airway Plan:            Plan Factors-Exercise tolerance (METS): >4 METS.    Chart reviewed.   Existing labs reviewed. Patient summary reviewed.    Patient is a current smoker.              Induction- intravenous.    Postoperative Plan-         Informed Consent- Anesthetic plan and risks discussed with patient.  I personally reviewed this patient with the CRNA. Discussed and agreed on the Anesthesia Plan with the CRNA..

## 2024-05-31 NOTE — INTERVAL H&P NOTE
H&P reviewed. After examining the patient I find no changes in the patients condition since the H&P had been written.    Vitals:    05/31/24 1136   BP: 126/82   Pulse: 104   Resp: 18   SpO2: 97%

## 2024-06-05 DIAGNOSIS — K21.9 GASTROESOPHAGEAL REFLUX DISEASE WITHOUT ESOPHAGITIS: Primary | ICD-10-CM

## 2024-06-05 PROCEDURE — 88305 TISSUE EXAM BY PATHOLOGIST: CPT | Performed by: PATHOLOGY

## 2024-06-05 RX ORDER — PANTOPRAZOLE SODIUM 40 MG/1
40 TABLET, DELAYED RELEASE ORAL DAILY
Qty: 30 TABLET | Refills: 3 | Status: SHIPPED | OUTPATIENT
Start: 2024-06-05

## 2024-06-06 ENCOUNTER — TELEPHONE (OUTPATIENT)
Dept: GASTROENTEROLOGY | Facility: CLINIC | Age: 34
End: 2024-06-06

## 2024-06-07 ENCOUNTER — OFFICE VISIT (OUTPATIENT)
Age: 34
End: 2024-06-07
Payer: COMMERCIAL

## 2024-06-07 VITALS
HEIGHT: 62 IN | HEART RATE: 112 BPM | WEIGHT: 151 LBS | SYSTOLIC BLOOD PRESSURE: 120 MMHG | BODY MASS INDEX: 27.79 KG/M2 | DIASTOLIC BLOOD PRESSURE: 79 MMHG | OXYGEN SATURATION: 98 %

## 2024-06-07 DIAGNOSIS — K22.70 BARRETT'S ESOPHAGUS WITHOUT DYSPLASIA: ICD-10-CM

## 2024-06-07 DIAGNOSIS — R14.0 ABDOMINAL BLOATING: ICD-10-CM

## 2024-06-07 DIAGNOSIS — K58.1 IRRITABLE BOWEL SYNDROME WITH CONSTIPATION: Primary | ICD-10-CM

## 2024-06-07 DIAGNOSIS — E53.8 B12 DEFICIENCY: ICD-10-CM

## 2024-06-07 PROCEDURE — 99214 OFFICE O/P EST MOD 30 MIN: CPT | Performed by: PHYSICIAN ASSISTANT

## 2024-06-07 RX ORDER — MAGNESIUM 200 MG
1000 TABLET ORAL DAILY
Qty: 90 TABLET | Refills: 0 | Status: CANCELLED | OUTPATIENT
Start: 2024-06-07

## 2024-06-07 RX ORDER — LINACLOTIDE 145 UG/1
145 CAPSULE, GELATIN COATED ORAL DAILY
Qty: 90 CAPSULE | Refills: 1 | Status: SHIPPED | OUTPATIENT
Start: 2024-06-07 | End: 2024-12-04

## 2024-06-07 NOTE — PROGRESS NOTES
[facility] - Outpatient Follow-up Note  Beatriz Otto 33 y.o. female MRN: [id number]  Encounter: [id number]          ASSESSMENT AND PLAN:      1. Enriquez's esophagus without dysplasia    Patient has a long history of GERD x many years.  EGD 5/31 showed Enriquez's esophagus, 1cm HH, and mild gastritis. Biopsies of the esophagus confirmed Enriquez's esophagus with intestinal metaplasia - no dysplasia and no malignancy, bx of the stomach showed mild inactive gastritis, no h pylori, and bx of the duodenum showed chronic duodenitis.     We reviewed the diagnosis of Enriquez's esophagus and need for surveillance EGDs in the future.  Pantoprazole 40mg po daily indefinitely.  She was instructed to contact us if her GERD symptoms are not adequately controlled on the PPI.  GERD modifications.  Strongly encouraged smoking cessation.    2. Abdominal bloating  3. Irritable bowel syndrome with constipation    Patient reports a long history of abdominal pain, cramping and significant constipation.  She has failed fiber supplementation and OTC laxatives, Miralax.  Colonoscopy with visualization of the terminal ileum was normal and biopsies were negative.    Will begin Linzess 145mcg po daily at least an hour before breakfast daily.  Will check a CT enterography to ensure her entire small bowel is normal (she reports her mother had an inflammatory bowel disease).  Will check celiac serology.    Follow up after the CT enterography.  ______________________________________________________________________    SUBJECTIVE:  Patient is a pleasant 33 year old female who presents to the office for follow up after her procedures.  She was prescribed Pantoprazole but has not picked it up yet.  She reports of significant heartburn and reflux.  She also reports a long history of abdominal bloating, abdominal pain, and constipation.  She reports about 2 bowel movements a week.  She has tried fiber and OTC  laxatives/Miralax in the past with insufficient relief.  She reports her mother had ulcerative colitis.  She is a smoker.      REVIEW OF SYSTEMS IS OTHERWISE NEGATIVE.      Historical Information   Past Medical History:   Diagnosis Date    Anxiety 2004    Arthritis 2022    Psoriatic arthrits    Asthma 1998    Juvenille asthma    Coronary artery disease 2015    2 trace leaks in valves    Depression 2004    GERD (gastroesophageal reflux disease)     Heart valve disease 2015    Hypertension     Inflammatory bowel disease 2022    Ibs-c    Irritable bowel     Kidney stone 2008,2011,2022    Memory loss     Few instances of short term memory loss the past few months    Migraine 2001    Psoriatic arthritis (HCC)     Urinary tract infection 2022    Bladder and kidney infections 3x    Visual impairment     Astigmatism, near sighted, far sighted, 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 No pertinent surgical history.  Social History   Social History     Substance and Sexual Activity   Alcohol Use Never     Social History     Substance and Sexual Activity   Drug Use Never     Social History     Tobacco Use   Smoking Status Every Day    Current packs/day: 0.50    Average packs/day: 0.5 packs/day for 17.4 years (8.7 ttl pk-yrs)    Types: Cigarettes    Start date: 1/1/2007   Smokeless Tobacco Never     Family History   Problem Relation Age of Onset    Asthma Mother     Heart disease Mother     Hypertension Mother     Lung disease Mother     Migraines Mother     Rashes / Skin problems Mother     Substance Abuse Mother     Mental illness Mother     Depression Mother     Coronary artery disease Mother     COPD Mother     Arthritis Mother     Autoimmune disease Mother     Vision loss Mother     Anxiety disorder Mother     Bipolar disorder Mother     Drug abuse Mother     Psychiatric Illness Mother     Cancer Father     Diabetes Father     Hypertension Father     Thyroid disease Father     Alcohol abuse Father     Substance Abuse  "Father     Depression Father     Arthritis Father     ADD / ADHD Father     Drug abuse Father     Asthma Maternal Grandmother     Heart disease Maternal Grandmother     Lung disease Maternal Grandmother     Osteoarthritis Maternal Grandmother     Stroke Maternal Grandmother     Coronary artery disease Maternal Grandmother     COPD Maternal Grandmother     Arthritis Maternal Grandmother     Hearing loss Maternal Grandmother     Vision loss Maternal Grandmother     Glaucoma Maternal Grandmother     Asthma Paternal Grandmother     Lung disease Paternal Grandmother     Coronary artery disease Paternal Grandmother     COPD Paternal Grandmother     Vision loss Paternal Grandmother     ADD / ADHD Brother        Meds/Allergies       Current Outpatient Medications:     ascorbic acid (VITAMIN C) 250 mg tablet    Biotin 1000 MCG CHEW    buPROPion (Wellbutrin SR) 150 mg 12 hr tablet    DULoxetine (CYMBALTA) 60 mg delayed release capsule    Levonorgestrel (Mirena, 52 MG,) 20 MCG/DAY IUD    linaCLOtide (Linzess) 145 MCG CAPS    pantoprazole (PROTONIX) 40 mg tablet    SUMAtriptan (Imitrex) 50 mg tablet    Cyanocobalamin (B-12) 1000 MCG SUBL    famotidine (PEPCID) 20 mg tablet    nabumetone (RELAFEN) 500 mg tablet    omeprazole (PriLOSEC) 40 MG capsule    Upadacitinib ER (Rinvoq) 15 MG TB24    Current Facility-Administered Medications:     cyanocobalamin injection 1,000 mcg, 1,000 mcg, Intramuscular, Q30 Days, 1,000 mcg at 02/29/24 1413    No Known Allergies        Objective     Blood pressure 120/79, pulse (!) 112, height 5' 2\" (1.575 m), weight 68.5 kg (151 lb), last menstrual period 05/31/2024, SpO2 98%. Body mass index is 27.62 kg/m².      PHYSICAL EXAM:      General Appearance:   Alert, cooperative, no distress   HEENT:   Normocephalic, atraumatic, anicteric.     Neck:  Supple, symmetrical, trachea midline   Lungs:   Clear to auscultation bilaterally; no rales, rhonchi or wheezing; respirations unlabored    Heart::   Regular " rate and rhythm; no murmur, rub, or gallop.   Abdomen:   Soft, non-tender, non-distended; normal bowel sounds; no masses, no organomegaly    Genitalia:   Deferred    Rectal:   Deferred    Extremities:  No cyanosis, clubbing or edema    Pulses:  2+ and symmetric    Skin:  No jaundice, rashes, or lesions    Lymph nodes:  No palpable cervical lymphadenopathy        Lab Results:   No visits with results within 1 Day(s) from this visit.   Latest known visit with results is:   Hospital Outpatient Visit on 05/31/2024   Component Date Value    EXT Preg Test, Ur 05/31/2024 Negative     Control 05/31/2024 Valid     Case Report 05/31/2024                      Value:Surgical Pathology Report                         Case: [id number]                                  Authorizing Provider:  Nicho Ji MD           Collected:           05/31/2024 1308              Ordering Location:      [facility]       Received:            05/31/2024 1412                                     [facility]                                                             Pathologist:           Janice Blakely MD                                                       Specimens:   A) - Duodenum                                                                                       B) - Stomach                                                                                        C) - Esophagus                                                                                      D) - Esophagus, distal                                                                              E) - Esophagus, proximal                                                                                                      F) - Terminal Ileum                                                                                 G) - Large Intestine, Left/Descending Colon, left                                                   H) - Large Intestine, Right/Ascending Colon,  right                                         Final Diagnosis 05/31/2024                      Value:A. Duodenum, biopsy:  - Small bowel mucosa with Brunner’s gland in the lamina propria and focal gastric foveolar metaplasia, suggestive of chronic duodenitis  - Negative for acute duodenitis  - Negative for dysplasia and malignancy     B. Stomach, biopsy:  - Mild chronic inactive gastritis and reactive epithelial changes  - No H. pylori organisms are identified on H&E staining  - Negative for intestinal metaplasia, dysplasia and malignancy      C. Esophagus, biopsy:  - Squamocolumnar mucosa with intestinal metaplasia, reactive epithelial changes and mild chronic inflammation (see note)  - Negative for dysplasia or malignancy     Note: The above diagnosis of Enriquez esophagus is made due to the presence of goblet cells (intestinal metaplasia) with the assumption that the biopsies were obtained from columnar mucosa in the distal esophagus located at least 1 cm proximal to the top of the gastric folds as per the 2016 American College of Gastroenterology (ACG) guidelines.    Reference:                           Reji NJ, Yulia GW, Sanford PG, Gregor LB: American College of Gastroenterology.  ACG Clinical Guideline: Diagnosis and Management of Enriquez's Esophagus.  Am J Gastroenterol. 2016 Jan;111(1): 30-50.      D. Esophagus, distal, biopsy:  - Benign squamous mucosa with mild reactive changes  - Negative for intestinal metaplasia, eosinophilic esophagitis, dysplasia and malignancy      E. Esophagus, proximal, biopsy:  - Benign squamous mucosa with reactive changes, including acanthosis and basal cell hyperplasia, and mildly increased peripapillary intraepithelial lymphocytes findings are consistent with reflux in the appropriate clinical/endoscopic setting  - Negative for intestinal metaplasia, eosinophilic esophagitis, dysplasia and malignancy      F. Terminal Ileum, biopsy:  - Small bowel mucosa with no significant  histopathologic abnormality  - Negative for acute inflammation and malabsorption pattern  - Negative for malignancy      G. Large Intestine, Left/Descending Colon,  biopsy:  -                           Benign colonic mucosa with no significant histopathologic abnormality  - No thickened basement membranes or intraepithelial lymphocytosis to suggest microscopic colitis (i.e. collagenous colitis or lymphocytic colitis)  - No active or chronic colitis  - No glandular dysplasia and no evidence of malignancy      H. Large Intestine, Right/Ascending Colon,  biopsy:  - Benign colonic mucosa with no significant histopathologic abnormality  - No thickened basement membranes or intraepithelial lymphocytosis to suggest microscopic colitis (i.e. collagenous colitis or lymphocytic colitis)  - No active or chronic colitis  - No glandular dysplasia and no evidence of malignancy         Additional Information 05/31/2024                      Value:All reported additional testing was performed with appropriately reactive controls.  These tests were developed and their performance characteristics determined by [facility] or appropriate performing facility, though some tests may be performed on tissues which have not been validated for performance characteristics (such as staining performed on alcohol exposed cell blocks and decalcified tissues).  Results should be interpreted with caution and in the context of the patients’ clinical condition. These tests may not be cleared or approved by the U.S. Food and Drug Administration, though the FDA has determined that such clearance or approval is not necessary. These tests are used for clinical purposes and they should not be regarded as investigational or for research. This laboratory has been approved by CLIA 88, designated as a high-complexity laboratory and is qualified to perform these tests.    Interpretation performed at [facility] happin!          "[place] [address]   .      Gross Description 05/31/2024                      Value:A. The specimen is received in formalin, labeled with the patient's name and hospital number, and is designated \" duodenum biopsy\".  The specimen consists of multiple tan-pink irregularly shaped tissue fragments measuring in aggregate of 1.4 x 0.5 x 0.2 cm.  Entirely submitted.  One screened cassette.    B. The specimen is received in formalin, labeled with the patient's name and hospital number, and is designated \" stomach biopsy\".  The specimen consists of 4 tan-pink flat and elongated tissue fragments measuring 0.3-0.6 cm in greatest dimension.  Entirely submitted.  One screened cassette.    C. The specimen is received in formalin, labeled with the patient's name and hospital number, and is designated \" esophagus biopsy\".  The specimen consists of 2 colorless tissue fragments measuring 0.1-0.3 cm in greatest dimension.  Entirely submitted.  One screened cassette.    D. The specimen is received in formalin, labeled with the patient's name and hospital number, and is designated \"                           distal esophagus biopsy\".  The specimen consists of 3 colorless tissue fragments measuring 0.2-0.4 cm in greatest dimension.  Entirely submitted.  One screened cassette.    E. The specimen is received in formalin, labeled with the patient's name and hospital number, and is designated \" proximal esophagus\".  The specimen consists of 3 colorless tissue fragments measuring 0.2-0.3 cm in greatest dimension.  Entirely submitted.  One screened cassette.    F. The specimen is received in formalin, labeled with the patient's name and hospital number, and is designated \" terminal ileum biopsy\".  The specimen consists of multiple tan-pink irregularly shaped tissue fragments measuring in aggregate of 0.8 x 0.4 x 0.2 cm.  Entirely submitted.  One screened cassette.    G. The specimen is received in formalin, labeled with " "the patient's name and hospital number, and is designated \" left descending colon\".  The specimen consists of 4 tan-pink tissue fragments measuring 0.3-0.5 cm in greatest                           dimension.  Entirely submitted.  One screened cassette.    H. The specimen is received in formalin, labeled with the patient's name and hospital number, and is designated \" right ascending colon\".  The specimen consists of 5 tan-pink tissue fragments measuring 0.1-0.4 cm in greatest dimension.  The specimen is entirely submitted in a screened cassette.    Note: The estimated total formalin fixation time based upon information provided by the submitting clinician and the standard processing schedule is under 72 hours. Devi Oropeza      Clinical Information 05/31/2024                      Value:Cold bx r/o c sprue         Radiology Results:   EGD    Addendum Date: 6/6/2024 Addendum:    [facility] [address] [facility] [address] [phone] DATE OF SERVICE: 5/31/24 PHYSICIAN(S): Attending: Nicho Ji MD Fellow: No Staff Documented INDICATION: GERD (gastroesophageal reflux disease), Irritable bowel syndrome, unspecified type POST-OP DIAGNOSIS: See the impression below. PREPROCEDURE: Informed consent was obtained for the procedure, including sedation.  Risks of perforation, hemorrhage, adverse drug reaction and aspiration were discussed. The patient was placed in the left lateral decubitus position. Patient was explained about the risks and benefits of the procedure. Risks including but not limited to bleeding, infection, and perforation were explained in detail. Also explained about less than 100% sensitivity with the exam and other alternatives. PROCEDURE: EGD DETAILS OF PROCEDURE: Patient was taken to the procedure room where a time out was performed to confirm correct patient and correct procedure. The patient underwent monitored anesthesia care, which was administered by an " anesthesia professional. The patient's blood pressure, heart rate, level of consciousness, respirations, oxygen, ECG and ETCO2 were monitored throughout the procedure. The scope was introduced through the mouth and advanced to the second part of the duodenum. Retroflexion was performed in the fundus. The patient experienced no blood loss. The procedure was not difficult. The patient tolerated the procedure well. There were no apparent adverse events. ANESTHESIA INFORMATION: ASA: II Anesthesia Type: IV Sedation with Anesthesia MEDICATIONS: No administrations occurring from 1257 to 1308 on 05/31/24 FINDINGS: C0M1 Enriquez's esophagus observed with 1 tongue. The Z-line was 34 cm from the incisors; electronic chromoendoscopy was used; performed targeted cold forceps biopsy 1 cm sliding hiatal hernia (type I hiatal hernia) - GE junction 34 cm from the incisors, diaphragmatic impression 35 cm from the incisors:  Hill classification: Grade II Mild, generalized edematous and erythematous mucosa in the stomach The 1st part of the duodenum and 2nd part of the duodenum appeared normal. Performed multiple forceps biopsies in the upper third of the esophagus and lower third of the esophagus. These biopsies were done to rule out EoE. Performed multiple forceps biopsies in the body of the stomach, incisura and antrum to rule out H. pylori Performed multiple forceps biopsies in the 1st part of the duodenum and 2nd part of the duodenum. Rule out Celiac disease. Dilated in the esophagus with Savjarod-Joana dilator using guidewire from 54 Fr starting size to 54 Fr ending size SPECIMENS: * No specimens in log * IMPRESSION: C0M1 Enriquez's esophagus; electronic chromoendoscopy was used; performed targeted cold forceps biopsy 1 cm type I hiatal hernia Mild edematous, erythematous mucosa in the stomach The 1st part of the duodenum and 2nd part of the duodenum appeared normal. Performed forceps biopsies in the upper third of the esophagus  and lower third of the esophagus Performed forceps biopsies in the body of the stomach, incisura and antrum to rule out H. pylori Performed forceps biopsies in the 1st part of the duodenum and 2nd part of the duodenum Dilated in the esophagus with Savary-Joana dilator to 54 Fr ending size RECOMMENDATION: Await pathology results Proceed with colonoscopy Repeat EGD in 5 years for Enriquez's esophagus.    Nicho Ji MD     Result Date: 6/6/2024  Narrative: Table formatting from the original result was not included.  [facility] [address] [facility] [address] [phone] DATE OF SERVICE: 5/31/24 PHYSICIAN(S): Attending: Nicho Ji MD Fellow: No Staff Documented INDICATION: GERD (gastroesophageal reflux disease), Irritable bowel syndrome, unspecified type POST-OP DIAGNOSIS: See the impression below. PREPROCEDURE: Informed consent was obtained for the procedure, including sedation.  Risks of perforation, hemorrhage, adverse drug reaction and aspiration were discussed. The patient was placed in the left lateral decubitus position. Patient was explained about the risks and benefits of the procedure. Risks including but not limited to bleeding, infection, and perforation were explained in detail. Also explained about less than 100% sensitivity with the exam and other alternatives. PROCEDURE: EGD DETAILS OF PROCEDURE: Patient was taken to the procedure room where a time out was performed to confirm correct patient and correct procedure. The patient underwent monitored anesthesia care, which was administered by an anesthesia professional. The patient's blood pressure, heart rate, level of consciousness, respirations, oxygen, ECG and ETCO2 were monitored throughout the procedure. The scope was introduced through the mouth and advanced to the second part of the duodenum. Retroflexion was performed in the fundus. The patient experienced no blood loss. The procedure was not difficult.  The patient tolerated the procedure well. There were no apparent adverse events. ANESTHESIA INFORMATION: ASA: II Anesthesia Type: IV Sedation with Anesthesia MEDICATIONS: No administrations occurring from 1257 to 1308 on 05/31/24 FINDINGS: C0M1 Enriquez's esophagus observed with 1 tongue. The Z-line was 34 cm from the incisors; electronic chromoendoscopy was used; performed targeted cold forceps biopsy 1 cm sliding hiatal hernia (type I hiatal hernia) - GE junction 34 cm from the incisors, diaphragmatic impression 35 cm from the incisors:  Hill classification: Grade II Mild, generalized edematous and erythematous mucosa in the stomach The 1st part of the duodenum and 2nd part of the duodenum appeared normal. Performed multiple forceps biopsies in the upper third of the esophagus and lower third of the esophagus. These biopsies were done to rule out EoE. Performed multiple forceps biopsies in the body of the stomach, incisura and antrum to rule out H. pylori Performed multiple forceps biopsies in the 1st part of the duodenum and 2nd part of the duodenum. Rule out Celiac disease. Dilated in the esophagus with Savary-Joana dilator using guidewire from 54 Fr starting size to 54 Fr ending size SPECIMENS: * No specimens in log *     Impression: C0M1 Enriquez's esophagus; electronic chromoendoscopy was used; performed targeted cold forceps biopsy 1 cm type I hiatal hernia Mild edematous, erythematous mucosa in the stomach The 1st part of the duodenum and 2nd part of the duodenum appeared normal. Performed forceps biopsies in the upper third of the esophagus and lower third of the esophagus Performed forceps biopsies in the body of the stomach, incisura and antrum to rule out H. pylori Performed forceps biopsies in the 1st part of the duodenum and 2nd part of the duodenum Dilated in the esophagus with Savary-Joana dilator to 54 Fr ending size RECOMMENDATION: Await pathology results Proceed with colonoscopy   Nicho  MD [place]     Colonoscopy    Result Date: 5/31/2024  Narrative: Table formatting from the original result was not included.  [facility] [address] [facility] [place] [address] [phone] DATE OF SERVICE: 5/31/24 PHYSICIAN(S): Attending: Nicho Ji MD Fellow: No Staff Documented INDICATION: GERD (gastroesophageal reflux disease), Irritable bowel syndrome, unspecified type POST-OP DIAGNOSIS: See the impression below. HISTORY: Prior colonoscopy: No prior colonoscopy. BOWEL PREPARATION: Miralax/Dulcolax PREPROCEDURE: Informed consent was obtained for the procedure, including sedation. Risks including but not limited to bleeding, infection, perforation, adverse drug reaction and aspiration were explained in detail. Also explained about less than 100% sensitivity with the exam and other alternatives. The patient was placed in the left lateral decubitus position. Procedure: Colonoscopy DETAILS OF PROCEDURE: Patient was taken to the procedure room where a time out was performed to confirm correct patient and correct procedure. The patient underwent monitored anesthesia care, which was administered by an anesthesia professional. The patient's blood pressure, heart rate, level of consciousness, oxygen, respirations, ECG and ETCO2 were monitored throughout the procedure. A digital rectal exam was performed. The scope was introduced through the anus and advanced to the cecum. Photodocumentation was obtained at the ileocecal valve, appendiceal orifice and retroflexed view of the rectum. Retroflexion was performed in the rectum. The quality of bowel preparation was evaluated using the [place] Bowel Preparation Scale with scores of: right colon = 2, transverse colon = 2, left colon = 2. The total BBPS score was 6. Bowel prep was adequate. The patient experienced no blood loss. The procedure was not difficult. The patient tolerated the procedure well. There were no apparent adverse events. ANESTHESIA  INFORMATION: ASA: II Anesthesia Type: IV Sedation with Anesthesia MEDICATIONS: No administrations occurring from 1257 to 1321 on 05/31/24 FINDINGS: The terminal ileum and entire colon appeared normal. Performed random biopsy using biopsy forceps. EVENTS: Procedure Events Event Event Time ENDO CECUM REACHED 5/31/2024  1:13 PM ENDO SCOPE OUT TIME 5/31/2024  1:21 PM SPECIMENS: ID Type Source Tests Collected by Time Destination 1 :  Tissue Duodenum TISSUE EXAM Nicho Ji MD 5/31/2024  1:08 PM  2 :  Tissue Stomach TISSUE EXAM Nicho Ji MD 5/31/2024  1:08 PM  3 :  Tissue Esophagus TISSUE EXAM Nicho Ji MD 5/31/2024  1:10 PM  4 : distal Tissue Esophagus TISSUE EXAM Nicho Ji MD 5/31/2024  1:10 PM  5 : proximal Tissue Esophagus TISSUE EXAM Nicho Ji MD 5/31/2024  1:11 PM  EQUIPMENT: Colonoscope -[id number]     Impression: The terminal ileum and entire colon appeared normal. Performed random biopsy using biopsy forceps. RECOMMENDATION: No further screening colonoscopies necessary Not recommended at this time due to age (patient below screening age).  Plan for age-appropriate colorectal cancer screening.    Nicho Ji MD

## 2024-06-10 ENCOUNTER — APPOINTMENT (OUTPATIENT)
Dept: LAB | Facility: CLINIC | Age: 34
End: 2024-06-10
Payer: COMMERCIAL

## 2024-06-10 DIAGNOSIS — Z79.899 HIGH RISK MEDICATION USE: ICD-10-CM

## 2024-06-10 DIAGNOSIS — L40.50 PSORIATIC ARTHRITIS (HCC): ICD-10-CM

## 2024-06-10 DIAGNOSIS — R14.0 ABDOMINAL BLOATING: ICD-10-CM

## 2024-06-10 PROCEDURE — 85652 RBC SED RATE AUTOMATED: CPT

## 2024-06-10 PROCEDURE — 36415 COLL VENOUS BLD VENIPUNCTURE: CPT

## 2024-06-10 PROCEDURE — 86140 C-REACTIVE PROTEIN: CPT

## 2024-06-10 PROCEDURE — 86364 TISS TRNSGLTMNASE EA IG CLAS: CPT

## 2024-06-10 PROCEDURE — 85025 COMPLETE CBC W/AUTO DIFF WBC: CPT

## 2024-06-10 PROCEDURE — 80053 COMPREHEN METABOLIC PANEL: CPT

## 2024-06-10 PROCEDURE — 82784 ASSAY IGA/IGD/IGG/IGM EACH: CPT

## 2024-06-11 LAB
ALBUMIN SERPL BCP-MCNC: 4.6 G/DL (ref 3.5–5)
ALP SERPL-CCNC: 73 U/L (ref 34–104)
ALT SERPL W P-5'-P-CCNC: 20 U/L (ref 7–52)
ANION GAP SERPL CALCULATED.3IONS-SCNC: 9 MMOL/L (ref 4–13)
AST SERPL W P-5'-P-CCNC: 20 U/L (ref 13–39)
BASOPHILS # BLD AUTO: 0.09 THOUSANDS/ÂΜL (ref 0–0.1)
BASOPHILS NFR BLD AUTO: 1 % (ref 0–1)
BILIRUB SERPL-MCNC: 0.4 MG/DL (ref 0.2–1)
BUN SERPL-MCNC: 10 MG/DL (ref 5–25)
CALCIUM SERPL-MCNC: 9.4 MG/DL (ref 8.4–10.2)
CHLORIDE SERPL-SCNC: 103 MMOL/L (ref 96–108)
CO2 SERPL-SCNC: 28 MMOL/L (ref 21–32)
CREAT SERPL-MCNC: 0.66 MG/DL (ref 0.6–1.3)
CRP SERPL QL: <1 MG/L
EOSINOPHIL # BLD AUTO: 0.13 THOUSAND/ÂΜL (ref 0–0.61)
EOSINOPHIL NFR BLD AUTO: 1 % (ref 0–6)
ERYTHROCYTE [DISTWIDTH] IN BLOOD BY AUTOMATED COUNT: 12.2 % (ref 11.6–15.1)
ERYTHROCYTE [SEDIMENTATION RATE] IN BLOOD: 10 MM/HOUR (ref 0–19)
GFR SERPL CREATININE-BSD FRML MDRD: 118 ML/MIN/1.73SQ M
GLUCOSE P FAST SERPL-MCNC: 66 MG/DL (ref 65–99)
HCT VFR BLD AUTO: 46.5 % (ref 34.8–46.1)
HGB BLD-MCNC: 15 G/DL (ref 11.5–15.4)
IGA SERPL-MCNC: 114 MG/DL (ref 66–433)
IMM GRANULOCYTES # BLD AUTO: 0.03 THOUSAND/UL (ref 0–0.2)
IMM GRANULOCYTES NFR BLD AUTO: 0 % (ref 0–2)
LYMPHOCYTES # BLD AUTO: 2.36 THOUSANDS/ÂΜL (ref 0.6–4.47)
LYMPHOCYTES NFR BLD AUTO: 26 % (ref 14–44)
MCH RBC QN AUTO: 28.7 PG (ref 26.8–34.3)
MCHC RBC AUTO-ENTMCNC: 32.3 G/DL (ref 31.4–37.4)
MCV RBC AUTO: 89 FL (ref 82–98)
MONOCYTES # BLD AUTO: 0.82 THOUSAND/ÂΜL (ref 0.17–1.22)
MONOCYTES NFR BLD AUTO: 9 % (ref 4–12)
NEUTROPHILS # BLD AUTO: 5.72 THOUSANDS/ÂΜL (ref 1.85–7.62)
NEUTS SEG NFR BLD AUTO: 63 % (ref 43–75)
NRBC BLD AUTO-RTO: 0 /100 WBCS
PLATELET # BLD AUTO: 321 THOUSANDS/UL (ref 149–390)
PMV BLD AUTO: 10.2 FL (ref 8.9–12.7)
POTASSIUM SERPL-SCNC: 4.3 MMOL/L (ref 3.5–5.3)
PROT SERPL-MCNC: 7.1 G/DL (ref 6.4–8.4)
RBC # BLD AUTO: 5.22 MILLION/UL (ref 3.81–5.12)
SODIUM SERPL-SCNC: 140 MMOL/L (ref 135–147)
TTG IGA SER-ACNC: <2 U/ML (ref 0–3)
WBC # BLD AUTO: 9.15 THOUSAND/UL (ref 4.31–10.16)

## 2024-06-17 ENCOUNTER — TELEPHONE (OUTPATIENT)
Age: 34
End: 2024-06-17

## 2024-06-17 NOTE — TELEPHONE ENCOUNTER
PA for Linzess 145    Submitted via    []CMM-KEY   []Surescripts-Case ID #   []Faxed to plan   [x]Other website Prompt RAHAT Roy case ID [id number]  []Phone call Case ID #     Office notes sent, clinical questions answered. Awaiting determination    Turnaround time for your insurance to make a decision on your Prior Authorization can take 7-21 business days.

## 2024-06-18 NOTE — TELEPHONE ENCOUNTER
Cleo from [facility] called in stating that the Linzess was approved with effective date of 6/17/24. Auth number is [id number]. Call back number is [phone].

## 2024-07-17 ENCOUNTER — HOSPITAL ENCOUNTER (OUTPATIENT)
Dept: CT IMAGING | Facility: HOSPITAL | Age: 34
Discharge: HOME | End: 2024-07-17
Payer: COMMERCIAL

## 2024-07-17 DIAGNOSIS — R14.0 ABDOMINAL BLOATING: ICD-10-CM

## 2024-07-17 PROCEDURE — 74177 CT ABD & PELVIS W/CONTRAST: CPT

## 2024-07-17 RX ADMIN — IOHEXOL 100 ML: 350 INJECTION, SOLUTION INTRAVENOUS at 16:23

## 2024-07-22 ENCOUNTER — TELEMEDICINE (OUTPATIENT)
Dept: RHEUMATOLOGY | Facility: CLINIC | Age: 34
End: 2024-07-22
Payer: COMMERCIAL

## 2024-07-22 DIAGNOSIS — L40.50 PSORIATIC ARTHRITIS (HCC): Primary | ICD-10-CM

## 2024-07-22 DIAGNOSIS — Z79.899 HIGH RISK MEDICATION USE: ICD-10-CM

## 2024-07-22 DIAGNOSIS — L40.9 SCALP PSORIASIS: ICD-10-CM

## 2024-07-22 PROCEDURE — 99214 OFFICE O/P EST MOD 30 MIN: CPT | Performed by: PHYSICIAN ASSISTANT

## 2024-07-22 NOTE — PROGRESS NOTES
Virtual Regular Visit  Name: Beatriz Otto      : 1990      MRN: [id number]  Encounter Provider: Osiel Melara PA-C  Encounter Date: 2024   Encounter department: [facility] [place]    Verification of patient location:    Patient is located at Home in the following state in which I hold an active license [place]    Assessment & Plan   1. Psoriatic arthritis (HCC)  -     CBC and differential; Standing  -     Comprehensive metabolic panel; Standing  -     C-reactive protein; Standing  -     Sedimentation rate, automated; Standing  2. Scalp psoriasis  3. High risk medication use  -     CBC and differential; Standing  -     Comprehensive metabolic panel; Standing  -     C-reactive protein; Standing  -     Sedimentation rate, automated; Standing      Ms. Otto is a 34-year-old female who presents for rheumatology follow-up of psoriatic arthritis and scalp psoriasis.     The patient presented at the last visit in 2024 to establish care.  At that time, she reported that she was experiencing flaking and itching of the scalp as well as joint pain for many years.  Per report, had dactylitis of the bilateral fourth fingers.  Apparently 1.5 to 2 years ago she was evaluated by rheumatology in [place] (Dr. Davis) and diagnosed with psoriatic arthritis.  She has trialed multiple medications including nabumetone, Advil, Xeljanz (GI upset) and Humira (only experienced benefit for 1 week after injection).      Due to ongoing inflammatory joint pains specifically in the bilateral hands, feet, hips, and knees, she was initiated on Rinvoq, to which she has experienced a favorable response.  She has noticed a dramatic improvement in the scalp psoriasis and also the joint pains.  She will continue to experience further improvement over the next few months as this medication reaches peak effectiveness.  She does not have any inflammatory joint pains at this time and we discussed that the  swelling that she is experiencing her in her fingers is most likely due to the excessive hot weather that we have been experiencing.  In terms of the tingling in her bilateral hands, I recommend that she follow-up with neurology in terms of her history of B12 deficiency which could potentially be contributing to this.  Continue every 12 week high-risk medication monitoring labs and continue her invoke unchanged.  Follow-up in 6 months    Encounter provider   Osiel Melara PA-C    The patient was identified by name and date of birth. Beatriz Otto was informed that this is a telemedicine visit and that the visit is being conducted through the Epic Embedded platform. She agrees to proceed..  My office door was closed. No one else was in the room.  She acknowledged consent and understanding of privacy and security of the video platform. The patient has agreed to participate and understands they can discontinue the visit at any time.    Patient is aware this is a billable service.     History of Present Illness     Beatriz Otto is a 34 y.o. female who presents for follow up of PsA and psoriasis.     Swelling of hands (fingers feel tight), cannot wear rings.  Psoriasis better on scalp  Jt pain better. No joint swelling recently. Not every day in regards to pain. Morning stiffness better.  C/o numbness and tingling of hands    Review of Systems  ENT/Mouth: Negative for hearing changes, ear pain, nasal congestion, sinus pain, hoarseness, sore throat, rhinorrhea, swallowing difficulty.   Eyes: Negative for pain, redness, discharge, vision changes.   Cardiovascular: + chest pain, SOB.   Respiratory: +cough   Gastrointestinal: + diarrhea, constipation, pain, heartburn.  Genitourinary: Negative for dysuria, urinary frequency, hematuria.   Musculoskeletal: As per HPI.  Skin: Negative for skin rash, color changes.   Neuro: + Numbness, tingling.    Psych: +anxiety, depression.   Heme/Lymph: Negative for easy bruising,  bleeding, lymphadenopathy.      Pertinent Medical History     Medical History Reviewed by provider this encounter:       Past Medical History   Past Medical History:   Diagnosis Date    Anxiety 2004    Arthritis 2022    Psoriatic arthrits    Asthma 1998    [place] asthma    Coronary artery disease 2015    2 trace leaks in valves    Depression 2004    GERD (gastroesophageal reflux disease)     Heart valve disease 2015    Hypertension     Inflammatory bowel disease 2022    Ibs-c    Irritable bowel     Kidney stone 2008,2011,2022    Memory loss     Few instances of short term memory loss the past few months    Migraine 2001    Psoriatic arthritis (HCC)     Urinary tract infection 2022    Bladder and kidney infections 3x    Visual impairment     Astigmatism, near sighted, far sighted, possible keraoconus     No past surgical history on file.  Family History   Problem Relation Age of Onset    Asthma Mother     Heart disease Mother     Hypertension Mother     Lung disease Mother     Migraines Mother     Rashes / Skin problems Mother     Substance Abuse Mother     Mental illness Mother     Depression Mother     Coronary artery disease Mother     COPD Mother     Arthritis Mother     Autoimmune disease Mother     Vision loss Mother     Anxiety disorder Mother     Bipolar disorder Mother     Drug abuse Mother     Psychiatric Illness Mother     Cancer Father     Diabetes Father     Hypertension Father     Thyroid disease Father     Alcohol abuse Father     Substance Abuse Father     Depression Father     Arthritis Father     ADD / ADHD Father     Drug abuse Father     Asthma Maternal Grandmother     Heart disease Maternal Grandmother     Lung disease Maternal Grandmother     Osteoarthritis Maternal Grandmother     Stroke Maternal Grandmother     Coronary artery disease Maternal Grandmother     COPD Maternal Grandmother     Arthritis Maternal Grandmother     Hearing loss Maternal Grandmother     Vision loss Maternal  Grandmother     Glaucoma Maternal Grandmother     Asthma Paternal Grandmother     Lung disease Paternal Grandmother     Coronary artery disease Paternal Grandmother     COPD Paternal Grandmother     Vision loss Paternal Grandmother     ADD / ADHD Brother      Current Outpatient Medications on File Prior to Visit   Medication Sig Dispense Refill    ascorbic acid (VITAMIN C) 250 mg tablet Take 250 mg by mouth daily      Biotin 1000 MCG CHEW Chew 3 (three) times a day      buPROPion (Wellbutrin SR) 150 mg 12 hr tablet Take 1 tablet (150 mg total) by mouth 2 (two) times a day 180 tablet 1    Cyanocobalamin (B-12) 1000 MCG SUBL PLACE 1 TABLET (1,000 MCG TOTAL) UNDER THE TONGUE IN THE MORNING 90 tablet 2    DULoxetine (CYMBALTA) 60 mg delayed release capsule Take 1 capsule (60 mg total) by mouth daily 90 capsule 3    famotidine (PEPCID) 20 mg tablet Take 1 tablet (20 mg total) by mouth daily 30 tablet 2    Levonorgestrel (Mirena, 52 MG,) 20 MCG/DAY IUD 1 each by Intrauterine route Once every 8 years      linaCLOtide (Linzess) 145 MCG CAPS Take 1 capsule (145 mcg total) by mouth in the morning 90 capsule 1    nabumetone (RELAFEN) 500 mg tablet Take 1 tablet (500 mg total) by mouth 2 (two) times a day 180 tablet 0    omeprazole (PriLOSEC) 40 MG capsule Take 1 capsule (40 mg total) by mouth daily before breakfast 30 capsule 3    pantoprazole (PROTONIX) 40 mg tablet Take 1 tablet (40 mg total) by mouth daily 30 tablet 3    SUMAtriptan (Imitrex) 50 mg tablet Take 1 tablet (50 mg total) by mouth once as needed for migraine Can take a second dose in 2 hours if not completely effective. Do not take more than 2 doses in a day. Do not take more than twice a week 9 tablet 4     Current Facility-Administered Medications on File Prior to Visit   Medication Dose Route Frequency Provider Last Rate Last Admin    cyanocobalamin injection 1,000 mcg  1,000 mcg Intramuscular Q30 Days    1,000 mcg at 02/29/24 1413   No Known Allergies    Current Outpatient Medications on File Prior to Visit   Medication Sig Dispense Refill    ascorbic acid (VITAMIN C) 250 mg tablet Take 250 mg by mouth daily      Biotin 1000 MCG CHEW Chew 3 (three) times a day      buPROPion (Wellbutrin SR) 150 mg 12 hr tablet Take 1 tablet (150 mg total) by mouth 2 (two) times a day 180 tablet 1    Cyanocobalamin (B-12) 1000 MCG SUBL PLACE 1 TABLET (1,000 MCG TOTAL) UNDER THE TONGUE IN THE MORNING 90 tablet 2    DULoxetine (CYMBALTA) 60 mg delayed release capsule Take 1 capsule (60 mg total) by mouth daily 90 capsule 3    famotidine (PEPCID) 20 mg tablet Take 1 tablet (20 mg total) by mouth daily 30 tablet 2    Levonorgestrel (Mirena, 52 MG,) 20 MCG/DAY IUD 1 each by Intrauterine route Once every 8 years      linaCLOtide (Linzess) 145 MCG CAPS Take 1 capsule (145 mcg total) by mouth in the morning 90 capsule 1    nabumetone (RELAFEN) 500 mg tablet Take 1 tablet (500 mg total) by mouth 2 (two) times a day 180 tablet 0    omeprazole (PriLOSEC) 40 MG capsule Take 1 capsule (40 mg total) by mouth daily before breakfast 30 capsule 3    pantoprazole (PROTONIX) 40 mg tablet Take 1 tablet (40 mg total) by mouth daily 30 tablet 3    SUMAtriptan (Imitrex) 50 mg tablet Take 1 tablet (50 mg total) by mouth once as needed for migraine Can take a second dose in 2 hours if not completely effective. Do not take more than 2 doses in a day. Do not take more than twice a week 9 tablet 4     Current Facility-Administered Medications on File Prior to Visit   Medication Dose Route Frequency Provider Last Rate Last Admin    cyanocobalamin injection 1,000 mcg  1,000 mcg Intramuscular Q30 Days    1,000 mcg at 02/29/24 1413      Social History     Tobacco Use    Smoking status: Every Day     Current packs/day: 0.50     Average packs/day: 0.5 packs/day for 17.6 years (8.8 ttl pk-yrs)     Types: Cigarettes     Start date: 1/1/2007    Smokeless tobacco: Never   Vaping Use    Vaping status: Never Used    Substance and Sexual Activity    Alcohol use: Never    Drug use: Never    Sexual activity: Yes     Partners: Male     Birth control/protection: I.U.D.     Objective     There were no vitals taken for this visit.  Physical Exam  General: AAOx3, no acute distress, appears stated age.  HEENT: EOM's intact, neck supple  MSK: no swelling appreciated b/l finger joints  Skin: no rash or PsO appreciated.    Visit Time  Total Visit Duration: > 8 min

## 2024-08-06 DIAGNOSIS — K58.1 IRRITABLE BOWEL SYNDROME WITH CONSTIPATION: Primary | ICD-10-CM

## 2024-08-06 RX ORDER — LINACLOTIDE 72 UG/1
72 CAPSULE, GELATIN COATED ORAL DAILY
Qty: 90 CAPSULE | Refills: 1 | Status: SHIPPED | OUTPATIENT
Start: 2024-08-06

## 2024-11-06 ENCOUNTER — VBI (OUTPATIENT)
Dept: ADMINISTRATIVE | Facility: OTHER | Age: 34
End: 2024-11-06

## 2024-11-06 NOTE — TELEPHONE ENCOUNTER
11/06/24 9:51 AM     Chart reviewed for Pap Smear (HPV) aka Cervical Cancer Screening was/were not submitted to the patient's insurance.     Unique Fleming MA   [facility]

## 2024-11-22 ENCOUNTER — TELEPHONE (OUTPATIENT)
Age: 34
End: 2024-11-22

## 2024-11-22 NOTE — TELEPHONE ENCOUNTER
Tiny with [facility] calling to inform that they have not filled the pt's Rinvoq since 7/26/24 and have been unable to reach the pt. Cancelled appt for January of next year says pt moved.